# Patient Record
Sex: FEMALE | Race: WHITE | Employment: FULL TIME | URBAN - METROPOLITAN AREA
[De-identification: names, ages, dates, MRNs, and addresses within clinical notes are randomized per-mention and may not be internally consistent; named-entity substitution may affect disease eponyms.]

---

## 2018-11-12 ENCOUNTER — TELEPHONE (OUTPATIENT)
Dept: FAMILY MEDICINE CLINIC | Age: 58
End: 2018-11-12

## 2018-11-12 NOTE — TELEPHONE ENCOUNTER
Seen in [facility] on Friday for diverticulitis and was told to follow up in 7 days. 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 Please advise.

## 2018-11-16 ENCOUNTER — OFFICE VISIT (OUTPATIENT)
Dept: FAMILY MEDICINE CLINIC | Age: 58
End: 2018-11-16
Payer: COMMERCIAL

## 2018-11-16 VITALS
OXYGEN SATURATION: 97 % | SYSTOLIC BLOOD PRESSURE: 132 MMHG | DIASTOLIC BLOOD PRESSURE: 84 MMHG | TEMPERATURE: 97.6 F | WEIGHT: 254 LBS | HEART RATE: 70 BPM | BODY MASS INDEX: 32.61 KG/M2

## 2018-11-16 DIAGNOSIS — R10.30 LOWER ABDOMINAL PAIN: Primary | ICD-10-CM

## 2018-11-16 LAB
BILIRUBIN, POC: NORMAL
BLOOD URINE, POC: NORMAL
CLARITY, POC: CLEAR
COLOR, POC: YELLOW
GLUCOSE URINE, POC: NORMAL
KETONES, POC: NORMAL
LEUKOCYTE EST, POC: NORMAL
NITRITE, POC: NORMAL
PH, POC: 6
PROTEIN, POC: NORMAL
SPECIFIC GRAVITY, POC: 1.01
UROBILINOGEN, POC: 0.2

## 2018-11-16 PROCEDURE — 99213 OFFICE O/P EST LOW 20 MIN: CPT | Performed by: FAMILY MEDICINE

## 2018-11-16 PROCEDURE — 81002 URINALYSIS NONAUTO W/O SCOPE: CPT | Performed by: FAMILY MEDICINE

## 2018-11-16 RX ORDER — CIPROFLOXACIN 500 MG/1
500 TABLET, FILM COATED ORAL 2 TIMES DAILY
COMMUNITY
End: 2019-09-30 | Stop reason: ALTCHOICE

## 2018-11-16 RX ORDER — METRONIDAZOLE 500 MG/1
500 TABLET ORAL 3 TIMES DAILY
COMMUNITY
End: 2019-09-30 | Stop reason: ALTCHOICE

## 2018-11-16 ASSESSMENT — PATIENT HEALTH QUESTIONNAIRE - PHQ9
2. FEELING DOWN, DEPRESSED OR HOPELESS: 0
SUM OF ALL RESPONSES TO PHQ9 QUESTIONS 1 & 2: 0
SUM OF ALL RESPONSES TO PHQ QUESTIONS 1-9: 0
1. LITTLE INTEREST OR PLEASURE IN DOING THINGS: 0
SUM OF ALL RESPONSES TO PHQ QUESTIONS 1-9: 0

## 2018-11-19 ENCOUNTER — TELEPHONE (OUTPATIENT)
Dept: FAMILY MEDICINE CLINIC | Age: 58
End: 2018-11-19

## 2018-11-19 DIAGNOSIS — Z00.00 ENCOUNTER FOR WELL ADULT EXAM WITHOUT ABNORMAL FINDINGS: Primary | ICD-10-CM

## 2018-11-20 ENCOUNTER — NURSE ONLY (OUTPATIENT)
Dept: FAMILY MEDICINE CLINIC | Age: 58
End: 2018-11-20
Payer: COMMERCIAL

## 2018-11-20 ENCOUNTER — HOSPITAL ENCOUNTER (OUTPATIENT)
Age: 58
Discharge: HOME | End: 2018-11-22
Payer: COMMERCIAL

## 2018-11-20 DIAGNOSIS — Z00.00 ENCOUNTER FOR WELL ADULT EXAM WITHOUT ABNORMAL FINDINGS: ICD-10-CM

## 2018-11-20 DIAGNOSIS — Z00.00 ENCOUNTER FOR WELL ADULT EXAM WITHOUT ABNORMAL FINDINGS: Primary | ICD-10-CM

## 2018-11-20 LAB
ALBUMIN SERPL-MCNC: 4.3 G/DL (ref 3.5–5.2)
ALP BLD-CCNC: 59 U/L (ref 35–104)
ALT SERPL-CCNC: 44 U/L (ref 0–32)
ANION GAP SERPL CALCULATED.3IONS-SCNC: 14 MMOL/L (ref 7–16)
AST SERPL-CCNC: 37 U/L (ref 0–31)
BASOPHILS ABSOLUTE: 0.07 E9/L (ref 0–0.2)
BASOPHILS RELATIVE PERCENT: 1.1 % (ref 0–2)
BILIRUB SERPL-MCNC: 0.4 MG/DL (ref 0–1.2)
BUN BLDV-MCNC: 10 MG/DL (ref 6–20)
CALCIUM SERPL-MCNC: 9.2 MG/DL (ref 8.6–10.2)
CHLORIDE BLD-SCNC: 99 MMOL/L (ref 98–107)
CHOLESTEROL, TOTAL: 155 MG/DL (ref 0–199)
CO2: 26 MMOL/L (ref 22–29)
CREAT SERPL-MCNC: 0.9 MG/DL (ref 0.5–1)
EOSINOPHILS ABSOLUTE: 0.24 E9/L (ref 0.05–0.5)
EOSINOPHILS RELATIVE PERCENT: 3.6 % (ref 0–6)
GFR AFRICAN AMERICAN: >60
GFR NON-AFRICAN AMERICAN: >60 ML/MIN/1.73
GLUCOSE BLD-MCNC: 83 MG/DL (ref 74–99)
HCT VFR BLD CALC: 43.1 % (ref 34–48)
HDLC SERPL-MCNC: 55 MG/DL
HEMOGLOBIN: 13.6 G/DL (ref 11.5–15.5)
IMMATURE GRANULOCYTES #: 0.01 E9/L
IMMATURE GRANULOCYTES %: 0.2 % (ref 0–5)
LDL CHOLESTEROL CALCULATED: 80 MG/DL (ref 0–99)
LYMPHOCYTES ABSOLUTE: 2.2 E9/L (ref 1.5–4)
LYMPHOCYTES RELATIVE PERCENT: 33.1 % (ref 20–42)
MCH RBC QN AUTO: 28.1 PG (ref 26–35)
MCHC RBC AUTO-ENTMCNC: 31.6 % (ref 32–34.5)
MCV RBC AUTO: 89 FL (ref 80–99.9)
MONOCYTES ABSOLUTE: 0.62 E9/L (ref 0.1–0.95)
MONOCYTES RELATIVE PERCENT: 9.3 % (ref 2–12)
NEUTROPHILS ABSOLUTE: 3.5 E9/L (ref 1.8–7.3)
NEUTROPHILS RELATIVE PERCENT: 52.7 % (ref 43–80)
PDW BLD-RTO: 13.1 FL (ref 11.5–15)
PLATELET # BLD: 331 E9/L (ref 130–450)
PMV BLD AUTO: 11 FL (ref 7–12)
POTASSIUM SERPL-SCNC: 4.5 MMOL/L (ref 3.5–5)
RBC # BLD: 4.84 E12/L (ref 3.5–5.5)
SODIUM BLD-SCNC: 139 MMOL/L (ref 132–146)
TOTAL PROTEIN: 7.1 G/DL (ref 6.4–8.3)
TRIGL SERPL-MCNC: 100 MG/DL (ref 0–149)
VLDLC SERPL CALC-MCNC: 20 MG/DL
WBC # BLD: 6.6 E9/L (ref 4.5–11.5)

## 2018-11-20 PROCEDURE — 85025 COMPLETE CBC W/AUTO DIFF WBC: CPT

## 2018-11-20 PROCEDURE — 80053 COMPREHEN METABOLIC PANEL: CPT

## 2018-11-20 PROCEDURE — 80061 LIPID PANEL: CPT

## 2018-11-20 PROCEDURE — 36415 COLL VENOUS BLD VENIPUNCTURE: CPT | Performed by: FAMILY MEDICINE

## 2018-11-26 ENCOUNTER — OFFICE VISIT (OUTPATIENT)
Dept: FAMILY MEDICINE CLINIC | Age: 58
End: 2018-11-26
Payer: COMMERCIAL

## 2018-11-26 VITALS
OXYGEN SATURATION: 97 % | BODY MASS INDEX: 32.61 KG/M2 | HEART RATE: 76 BPM | WEIGHT: 254 LBS | TEMPERATURE: 98 F | SYSTOLIC BLOOD PRESSURE: 138 MMHG | DIASTOLIC BLOOD PRESSURE: 88 MMHG

## 2018-11-26 DIAGNOSIS — Z12.31 ENCOUNTER FOR MAMMOGRAM TO ESTABLISH BASELINE MAMMOGRAM: ICD-10-CM

## 2018-11-26 DIAGNOSIS — Z00.00 ENCOUNTER FOR WELL ADULT EXAM WITHOUT ABNORMAL FINDINGS: Primary | ICD-10-CM

## 2018-11-26 DIAGNOSIS — R74.8 ELEVATED LIVER ENZYMES: ICD-10-CM

## 2018-11-26 PROCEDURE — 99213 OFFICE O/P EST LOW 20 MIN: CPT | Performed by: FAMILY MEDICINE

## 2018-11-26 NOTE — PROGRESS NOTES
specific concerns today. Health maintenance reviewed and updated where agreeable. Labs reviewed and only concern is elevated liver function. Significantly improved cholesterol. Encounter for mammogram to establish baseline mammogram  -     VALERY DIGITAL SCREEN W CAD BILATERAL; Future    Elevated liver enzymes  -     Hepatic Function Panel; Future  -     Hepatitis Panel, Acute; Future  Recheck next month. Return in about 6 weeks (around 1/7/2019) for LABS. Patient counseled to follow up sooner or seek more acute care if symptoms worsening. Electronically signed by Sue Bolton MD on 11/27/2018    This note may have been created using dictation software.  Efforts were made to reduce grammatical or syntax errors, but some may persist.

## 2018-12-14 ENCOUNTER — HOSPITAL ENCOUNTER (OUTPATIENT)
Dept: MAMMOGRAPHY | Age: 58
Discharge: HOME | End: 2018-12-16
Payer: COMMERCIAL

## 2018-12-14 DIAGNOSIS — Z12.31 ENCOUNTER FOR MAMMOGRAM TO ESTABLISH BASELINE MAMMOGRAM: ICD-10-CM

## 2018-12-14 PROCEDURE — 77067 SCR MAMMO BI INCL CAD: CPT

## 2019-01-08 ENCOUNTER — HOSPITAL ENCOUNTER (OUTPATIENT)
Age: 59
Discharge: HOME | End: 2019-01-10
Payer: COMMERCIAL

## 2019-01-08 ENCOUNTER — TELEPHONE (OUTPATIENT)
Dept: FAMILY MEDICINE CLINIC | Age: 59
End: 2019-01-08

## 2019-01-08 ENCOUNTER — NURSE ONLY (OUTPATIENT)
Dept: FAMILY MEDICINE CLINIC | Age: 59
End: 2019-01-08
Payer: COMMERCIAL

## 2019-01-08 DIAGNOSIS — R74.8 ELEVATED LIVER ENZYMES: ICD-10-CM

## 2019-01-08 DIAGNOSIS — R74.8 ELEVATED LIVER ENZYMES: Primary | ICD-10-CM

## 2019-01-08 PROCEDURE — 80076 HEPATIC FUNCTION PANEL: CPT

## 2019-01-08 PROCEDURE — 36415 COLL VENOUS BLD VENIPUNCTURE: CPT | Performed by: FAMILY MEDICINE

## 2019-01-08 PROCEDURE — 80074 ACUTE HEPATITIS PANEL: CPT

## 2019-01-09 LAB
ALBUMIN SERPL-MCNC: 4.3 G/DL (ref 3.5–5.2)
ALP BLD-CCNC: 64 U/L (ref 35–104)
ALT SERPL-CCNC: 19 U/L (ref 0–32)
AST SERPL-CCNC: 20 U/L (ref 0–31)
BILIRUB SERPL-MCNC: 0.3 MG/DL (ref 0–1.2)
BILIRUBIN DIRECT: <0.2 MG/DL (ref 0–0.3)
BILIRUBIN, INDIRECT: NORMAL MG/DL (ref 0–1)
HAV IGM SER IA-ACNC: NORMAL
HEPATITIS B CORE IGM ANTIBODY: NORMAL
HEPATITIS B SURFACE ANTIGEN INTERPRETATION: NORMAL
HEPATITIS C ANTIBODY INTERPRETATION: NORMAL
TOTAL PROTEIN: 7.6 G/DL (ref 6.4–8.3)

## 2019-09-30 ENCOUNTER — OFFICE VISIT (OUTPATIENT)
Dept: FAMILY MEDICINE CLINIC | Age: 59
End: 2019-09-30
Payer: COMMERCIAL

## 2019-09-30 VITALS
WEIGHT: 261 LBS | DIASTOLIC BLOOD PRESSURE: 88 MMHG | BODY MASS INDEX: 35.35 KG/M2 | SYSTOLIC BLOOD PRESSURE: 136 MMHG | HEART RATE: 80 BPM | HEIGHT: 72 IN | TEMPERATURE: 98.8 F | OXYGEN SATURATION: 96 %

## 2019-09-30 DIAGNOSIS — J20.9 ACUTE BRONCHITIS, UNSPECIFIED ORGANISM: Primary | ICD-10-CM

## 2019-09-30 PROCEDURE — 99213 OFFICE O/P EST LOW 20 MIN: CPT | Performed by: PHYSICIAN ASSISTANT

## 2019-09-30 RX ORDER — CIPROFLOXACIN HYDROCHLORIDE 3.5 MG/ML
SOLUTION/ DROPS TOPICAL
Refills: 0 | COMMUNITY
Start: 2019-09-27 | End: 2020-01-15

## 2019-09-30 RX ORDER — CEFDINIR 300 MG/1
300 CAPSULE ORAL 2 TIMES DAILY
Qty: 20 CAPSULE | Refills: 0 | Status: SHIPPED | OUTPATIENT
Start: 2019-09-30 | End: 2019-10-10

## 2019-09-30 NOTE — PROGRESS NOTES
2019   [address] [address]     Camilo Campa  : 1960 Age: 64 y.o. Sex: female       Subjective:  Chief Complaint   Patient presents with    Sinus Problem    Pharyngitis       HPI:  The patient states that they have had a cough, runny nose and nasal congestion for the last 7 days. Cough is productive of sputum. The patient also reports mild sore throat without pain with swallowing/difficulty swallowing. Denies fever or chills. Patient denies CP or dyspnea. No vomiting or diarrhea. The patient presents for evaluation. ROS:  Positive and pertinent negatives as per HPI. All other systems are reviewed and negative. Current Outpatient Medications:     ciprofloxacin (CILOXAN) 0.3 % ophthalmic solution, INSTILL 1 DROP INTO EACH EYE EVERY 3 HOURS, Disp: , Rfl: 0    cefdinir (OMNICEF) 300 MG capsule, Take 1 capsule by mouth 2 times daily for 10 days, Disp: 20 capsule, Rfl: 0   Allergies   Allergen Reactions    Pyridium [Phenazopyridine Hcl]         Objective:  Vitals:    19 1459   BP: 136/88   Pulse: 80   Temp: 98.8 °F (37.1 °C)   TempSrc: Temporal   SpO2: 96%   Weight: 261 lb (118.4 kg)   Height: 6' 2\" (1.88 m)      Exam:  Const: Appears healthy and well developed. No signs of acute distress present. Vitals reviewed per triage. Head/Face: Normocephalic, atraumatic. Facies is symmetric. Eyes: PERRL. ENMT: Tympanic membranes are pearly gray with good light reflex bilaterally. Nares with clear rhinorrhea. Buccal mucosa is moist. Mild erythema in the posterior pharynx without edema of oropharynx or petechiae of palate. Neck: Supple and symmetric. Palpation reveals no adenopathy. No meningeal signs. Trachea midline. Resp: Lungs are clear to auscultation bilaterally without wheezes, rhonchi, or crackles. Chest expansion was symmetrical without accessory muscle use noted.   CV: S1 is normal. S2 is

## 2020-01-15 ENCOUNTER — OFFICE VISIT (OUTPATIENT)
Dept: FAMILY MEDICINE CLINIC | Age: 60
End: 2020-01-15
Payer: COMMERCIAL

## 2020-01-15 VITALS
DIASTOLIC BLOOD PRESSURE: 100 MMHG | RESPIRATION RATE: 18 BRPM | HEART RATE: 87 BPM | SYSTOLIC BLOOD PRESSURE: 160 MMHG | WEIGHT: 264 LBS | TEMPERATURE: 98.8 F | BODY MASS INDEX: 35.76 KG/M2 | OXYGEN SATURATION: 96 % | HEIGHT: 72 IN

## 2020-01-15 PROCEDURE — 99213 OFFICE O/P EST LOW 20 MIN: CPT | Performed by: PHYSICIAN ASSISTANT

## 2020-01-15 RX ORDER — CEFDINIR 300 MG/1
300 CAPSULE ORAL 2 TIMES DAILY
Qty: 20 CAPSULE | Refills: 0 | Status: SHIPPED | OUTPATIENT
Start: 2020-01-15 | End: 2020-01-25

## 2020-09-23 ENCOUNTER — OFFICE VISIT (OUTPATIENT)
Dept: FAMILY MEDICINE CLINIC | Age: 60
End: 2020-09-23
Payer: COMMERCIAL

## 2020-09-23 VITALS
SYSTOLIC BLOOD PRESSURE: 170 MMHG | OXYGEN SATURATION: 99 % | HEIGHT: 72 IN | BODY MASS INDEX: 36.57 KG/M2 | WEIGHT: 270 LBS | DIASTOLIC BLOOD PRESSURE: 106 MMHG | TEMPERATURE: 97.9 F | HEART RATE: 72 BPM

## 2020-09-23 PROCEDURE — 99213 OFFICE O/P EST LOW 20 MIN: CPT | Performed by: FAMILY MEDICINE

## 2020-09-23 PROCEDURE — 3017F COLORECTAL CA SCREEN DOC REV: CPT | Performed by: FAMILY MEDICINE

## 2020-09-23 PROCEDURE — G8427 DOCREV CUR MEDS BY ELIG CLIN: HCPCS | Performed by: FAMILY MEDICINE

## 2020-09-23 PROCEDURE — 1036F TOBACCO NON-USER: CPT | Performed by: FAMILY MEDICINE

## 2020-09-23 PROCEDURE — G8417 CALC BMI ABV UP PARAM F/U: HCPCS | Performed by: FAMILY MEDICINE

## 2020-09-23 RX ORDER — METHYLPREDNISOLONE 4 MG/1
TABLET ORAL
Qty: 1 KIT | Refills: 0 | Status: SHIPPED
Start: 2020-09-23 | End: 2020-10-16 | Stop reason: ALTCHOICE

## 2020-09-23 ASSESSMENT — ENCOUNTER SYMPTOMS
DIARRHEA: 0
SHORTNESS OF BREATH: 0
NAUSEA: 0
PHOTOPHOBIA: 0
ABDOMINAL PAIN: 0
BLOOD IN STOOL: 0
COUGH: 0
BACK PAIN: 0
SORE THROAT: 0
VOMITING: 0
CONSTIPATION: 0

## 2020-10-16 ENCOUNTER — OFFICE VISIT (OUTPATIENT)
Dept: FAMILY MEDICINE CLINIC | Age: 60
End: 2020-10-16
Payer: COMMERCIAL

## 2020-10-16 ENCOUNTER — HOSPITAL ENCOUNTER (OUTPATIENT)
Age: 60
Discharge: HOME | End: 2020-10-18
Payer: COMMERCIAL

## 2020-10-16 VITALS
TEMPERATURE: 98 F | DIASTOLIC BLOOD PRESSURE: 94 MMHG | HEART RATE: 77 BPM | BODY MASS INDEX: 35.49 KG/M2 | OXYGEN SATURATION: 99 % | WEIGHT: 262 LBS | HEIGHT: 72 IN | SYSTOLIC BLOOD PRESSURE: 152 MMHG

## 2020-10-16 LAB
ALBUMIN SERPL-MCNC: 4.7 G/DL (ref 3.5–5.2)
ALP BLD-CCNC: 67 U/L (ref 35–104)
ALT SERPL-CCNC: 33 U/L (ref 0–32)
ANION GAP SERPL CALCULATED.3IONS-SCNC: 13 MMOL/L (ref 7–16)
AST SERPL-CCNC: 31 U/L (ref 0–31)
BASOPHILS ABSOLUTE: 0.07 E9/L (ref 0–0.2)
BASOPHILS RELATIVE PERCENT: 0.8 % (ref 0–2)
BILIRUB SERPL-MCNC: 0.4 MG/DL (ref 0–1.2)
BUN BLDV-MCNC: 15 MG/DL (ref 6–20)
CALCIUM SERPL-MCNC: 9.9 MG/DL (ref 8.6–10.2)
CHLORIDE BLD-SCNC: 101 MMOL/L (ref 98–107)
CHOLESTEROL, TOTAL: 209 MG/DL (ref 0–199)
CO2: 27 MMOL/L (ref 22–29)
CREAT SERPL-MCNC: 0.9 MG/DL (ref 0.5–1)
EOSINOPHILS ABSOLUTE: 0.31 E9/L (ref 0.05–0.5)
EOSINOPHILS RELATIVE PERCENT: 3.3 % (ref 0–6)
GFR AFRICAN AMERICAN: >60
GFR NON-AFRICAN AMERICAN: >60 ML/MIN/1.73
GLUCOSE BLD-MCNC: 84 MG/DL (ref 74–99)
HCT VFR BLD CALC: 45.5 % (ref 34–48)
HDLC SERPL-MCNC: 66 MG/DL
HEMOGLOBIN: 14.6 G/DL (ref 11.5–15.5)
IMMATURE GRANULOCYTES #: 0.02 E9/L
IMMATURE GRANULOCYTES %: 0.2 % (ref 0–5)
LDL CHOLESTEROL CALCULATED: 114 MG/DL (ref 0–99)
LYMPHOCYTES ABSOLUTE: 3.03 E9/L (ref 1.5–4)
LYMPHOCYTES RELATIVE PERCENT: 32.7 % (ref 20–42)
MCH RBC QN AUTO: 28.4 PG (ref 26–35)
MCHC RBC AUTO-ENTMCNC: 32.1 % (ref 32–34.5)
MCV RBC AUTO: 88.5 FL (ref 80–99.9)
MONOCYTES ABSOLUTE: 0.79 E9/L (ref 0.1–0.95)
MONOCYTES RELATIVE PERCENT: 8.5 % (ref 2–12)
NEUTROPHILS ABSOLUTE: 5.06 E9/L (ref 1.8–7.3)
NEUTROPHILS RELATIVE PERCENT: 54.5 % (ref 43–80)
PDW BLD-RTO: 14 FL (ref 11.5–15)
PLATELET # BLD: 317 E9/L (ref 130–450)
PMV BLD AUTO: 10.9 FL (ref 7–12)
POTASSIUM SERPL-SCNC: 3.9 MMOL/L (ref 3.5–5)
RBC # BLD: 5.14 E12/L (ref 3.5–5.5)
SODIUM BLD-SCNC: 141 MMOL/L (ref 132–146)
TOTAL PROTEIN: 7.8 G/DL (ref 6.4–8.3)
TRIGL SERPL-MCNC: 143 MG/DL (ref 0–149)
VLDLC SERPL CALC-MCNC: 29 MG/DL
WBC # BLD: 9.3 E9/L (ref 4.5–11.5)

## 2020-10-16 PROCEDURE — G8484 FLU IMMUNIZE NO ADMIN: HCPCS | Performed by: FAMILY MEDICINE

## 2020-10-16 PROCEDURE — 80053 COMPREHEN METABOLIC PANEL: CPT

## 2020-10-16 PROCEDURE — G8427 DOCREV CUR MEDS BY ELIG CLIN: HCPCS | Performed by: FAMILY MEDICINE

## 2020-10-16 PROCEDURE — 80061 LIPID PANEL: CPT

## 2020-10-16 PROCEDURE — G8417 CALC BMI ABV UP PARAM F/U: HCPCS | Performed by: FAMILY MEDICINE

## 2020-10-16 PROCEDURE — 36415 COLL VENOUS BLD VENIPUNCTURE: CPT

## 2020-10-16 PROCEDURE — 3017F COLORECTAL CA SCREEN DOC REV: CPT | Performed by: FAMILY MEDICINE

## 2020-10-16 PROCEDURE — 99213 OFFICE O/P EST LOW 20 MIN: CPT | Performed by: FAMILY MEDICINE

## 2020-10-16 PROCEDURE — 1036F TOBACCO NON-USER: CPT | Performed by: FAMILY MEDICINE

## 2020-10-16 PROCEDURE — 85025 COMPLETE CBC W/AUTO DIFF WBC: CPT

## 2020-10-16 ASSESSMENT — PATIENT HEALTH QUESTIONNAIRE - PHQ9
SUM OF ALL RESPONSES TO PHQ QUESTIONS 1-9: 0
SUM OF ALL RESPONSES TO PHQ QUESTIONS 1-9: 0
SUM OF ALL RESPONSES TO PHQ9 QUESTIONS 1 & 2: 0
1. LITTLE INTEREST OR PLEASURE IN DOING THINGS: 0
2. FEELING DOWN, DEPRESSED OR HOPELESS: 0
SUM OF ALL RESPONSES TO PHQ QUESTIONS 1-9: 0

## 2020-10-16 NOTE — PROGRESS NOTES
[facility]  Office Progress Note - Dr. Radha Lovett  10/16/20    CC:   Chief Complaint   Patient presents with    Knee Pain     Left knee. Follow up from [facility] on 09/23        HPI: UC FU    Left knee pain  Couple weeks  No injury  Posterior. Lateral  No instability. Worse with flexion. Tylenol ~ helpful. xrays reviewed with patient, mild arthritis BL, decent joint space. Suspect baker cyst given location and fullness on PE.     /  Elev BP reading. Has bene a few of them now. Blood pressure is not controlled today. BP Readings from Last 3 Encounters:   10/16/20 (!) 152/94   09/23/20 (!) 170/106   01/15/20 (!) 160/100   Never Tx  Denies CP, sob, abd pain, headaches, vision changes, dizziness, hypotensive symptoms. No recent labs. Due for mammo. Notes Hx area that they had watched in the past.      _________________________________________________________  No past medical history on file. Family History   Problem Relation Age of Onset    Heart Disease Mother 58    Cancer Father 79        Colon    Diabetes Father        Past Surgical History:   Procedure Laterality Date    HYSTERECTOMY      MANDIBLE FRACTURE SURGERY      VARICOSE VEIN SURGERY         Social History     Tobacco Use    Smoking status: Never Smoker    Smokeless tobacco: Never Used   Substance Use Topics    Alcohol use: No    Drug use: No     _________________________________________________________  ROS: POSITIVE:  Otherwise:  No CP, No palpitations,   No sob, No cough,   No abd pain, No heartburn,   No headaches, No vision changes, No hearing changes,   No tingling, No numbness, No weakness,   No bowel changes, No hematochezia, No melena,  No bladder changes, No hematuria  No skin rashes, No skin lesions. No polyuria, polydipsia, polyphagia. Stable mood. ROS otherwise negative unless as listed in HPI.     Chart reviewed and updated where appropriate for PMH, Fam, and Soc Hx.  __________________________________________________________  Physical Exam   BP (!) 152/94 (Site: Left Upper Arm, Position: Sitting, Cuff Size: Large Adult)   Pulse 77   Temp 98 °F (36.7 °C) (Temporal)   Ht 6' 2\" (1.88 m)   Wt 262 lb (118.8 kg)   SpO2 99%   BMI 33.64 kg/m²   Wt Readings from Last 3 Encounters:   10/16/20 262 lb (118.8 kg)   09/23/20 270 lb (122.5 kg)   01/15/20 264 lb (119.7 kg)       Constitutional:    She is oriented to person, place, and time. She appears well-developed and well-nourished. HENT:    Nose: Nose normal.    Mouth/Throat: Oropharynx is clear and moist.   Eyes:    Conjunctivae are normal.    Pupils are equal, round, and reactive to light. EOMI. Neck:    Normal range of motion. No thyromegaly or nodules noted. No bruit. Cardiovascular:    Normal rate, regular rhythm and normal heart sounds. No murmur. No gallop and no friction rub. Pulmonary/Chest:    Effort normal and breath sounds normal.    No wheezes. No rales or rhonchi. Abdominal:    Soft. Bowel sounds are normal.    No distension. No tenderness. Musculoskeletal:    Normal range of motion. Fullness posterior lateral left knee. Stable joint, no laxity. No effusion. No jointline ttp. No joint swelling noted. No peripheral edema. Skin:    Skin is warm and dry. No rashes, No lesions. ________________________________________________________  No current outpatient medications on file prior to visit. No current facility-administered medications on file prior to visit. Patient Active Problem List   Diagnosis Code    Newly recognized heart murmur R01.1    Breast nodule N63.0      ________________________________________________________  Assessment / Oriana Peters was seen today for knee pain. Diagnoses and all orders for this visit:    Elevated blood pressure reading  -     CBC Auto Differential; Future  -     Comprehensive Metabolic Panel;  Future  -     Lipid Panel;

## 2020-10-16 NOTE — PATIENT INSTRUCTIONS
Patient Education        Leroy's Cyst: Care Instructions  Your Care Instructions     A Baker's cyst is a swelling behind the knee. It may cause pain or stiffness when you bend your knee or straighten it all the way. Baker's cysts are also called popliteal cysts. If you have arthritis or another condition that is the cause of the Baker's cyst, your doctor may treat that condition. A Baker's cyst may go away on its own. If not, or if it is causing a lot of discomfort, your doctor may drain the fluid that has built up behind the knee. In some cases, a Baker's cyst is removed in surgery. There are things you can do at home, such as staying off your leg, to reduce the swelling and pain. Follow-up care is a key part of your treatment and safety. Be sure to make and go to all appointments, and call your doctor if you are having problems. It's also a good idea to know your test results and keep a list of the medicines you take. How can you care for yourself at home? · Rest your knee as much as possible. · Ask your doctor if you can take an over-the-counter pain medicine, such as acetaminophen (Tylenol), ibuprofen (Advil, Motrin), or naproxen (Aleve). Be safe with medicines. Read and follow all instructions on the label. · Use a cane, a crutch, a walker, or another device if you need help to get around. These can help rest your knees. · If you have an elastic bandage, make sure it is snug but not so tight that your leg is numb, tingles, or swells below the bandage. Ask your doctor if you can loosen the bandage if it is too tight. · Follow your doctor's instructions about how much weight you can put on your knee. · Stay at a healthy weight. Being overweight puts extra strain on your knee. When should you call for help? Call 911 anytime you think you may need emergency care. For example, call if:    · You have chest pain, are short of breath, or you cough up blood.    Call your doctor now or seek immediate medical care if:    · You have new or worse pain.     · Your foot is cool or pale or changes color.     · You have tingling, weakness, or numbness in your foot or toes.     · You have signs of a blood clot in your leg (called a deep vein thrombosis), such as:  ? Pain in your calf, back of the knee, thigh, or groin. ? Redness or swelling in your leg. Watch closely for changes in your health, and be sure to contact your doctor if:    · You do not get better as expected. Where can you learn more? Go to https://[employer]. org and sign in to your [employer] account. Enter P787 in the [employer] box to learn more about \"Baker's Cyst: Care Instructions. \"     If you do not have an account, please click on the \"Sign Up Now\" link. Current as of: March 2, 2020               Content Version: 12.6  © 3058-8587 [employer]. Care instructions adapted under license by [facility] ([facility]). If you have questions about a medical condition or this instruction, always ask your healthcare professional. [place] [address] any warranty or liability for your use of this information.

## 2020-10-20 ENCOUNTER — TELEPHONE (OUTPATIENT)
Dept: ONCOLOGY | Age: 60
End: 2020-10-20

## 2020-10-20 NOTE — TELEPHONE ENCOUNTER
Call to patient in reference to her mammogram performed at [facility] on October 16, 2020. Instructed patient that the radiologist has recommended some additional breast imaging  in order to make a final determination/result. Patient is declining any additional imaging at this time. Physician notified.       Kelvin Sotomayor RN, BSN, [address]

## 2020-10-27 ENCOUNTER — TELEPHONE (OUTPATIENT)
Dept: FAMILY MEDICINE CLINIC | Age: 60
End: 2020-10-27

## 2020-10-27 RX ORDER — MELOXICAM 15 MG/1
15 TABLET ORAL DAILY
Qty: 30 TABLET | Refills: 0 | Status: SHIPPED
Start: 2020-10-27 | End: 2020-11-23 | Stop reason: SDUPTHER

## 2020-10-27 NOTE — TELEPHONE ENCOUNTER
Patient called back she left message for u/s scheduling. Will you order her something for pain?  She is getting no relief from otc arthritis pain relief

## 2020-10-27 NOTE — TELEPHONE ENCOUNTER
Id like to get an US of her left knee as I think she might have a cyst behind the knee as her source of pain. Orders placed. Can be done at [place]. Or can fax to [facility] if this works better for her.

## 2020-11-23 RX ORDER — MELOXICAM 15 MG/1
15 TABLET ORAL DAILY
Qty: 30 TABLET | Refills: 1 | Status: SHIPPED
Start: 2020-11-23 | End: 2021-01-12 | Stop reason: SDUPTHER

## 2020-11-23 NOTE — TELEPHONE ENCOUNTER
Name of Medication(s) Requested:  Meloxicam    Pharmacy Requested:   [facility]    Medication(s) pended? [x] Yes  [] No    Last Appointment:  10/16/2020    Future appts:  Future Appointments   Date Time Provider Rae Juana   12/1/2020  3:40 PM Kyle Arrieta MD [facility]        Does patient need call back?   [] Yes  [x] No

## 2021-01-12 ENCOUNTER — OFFICE VISIT (OUTPATIENT)
Dept: FAMILY MEDICINE CLINIC | Age: 61
End: 2021-01-12
Payer: COMMERCIAL

## 2021-01-12 VITALS
BODY MASS INDEX: 36.03 KG/M2 | HEIGHT: 72 IN | HEART RATE: 73 BPM | SYSTOLIC BLOOD PRESSURE: 118 MMHG | DIASTOLIC BLOOD PRESSURE: 72 MMHG | TEMPERATURE: 97.6 F | OXYGEN SATURATION: 97 % | WEIGHT: 266 LBS

## 2021-01-12 DIAGNOSIS — M25.562 ACUTE PAIN OF LEFT KNEE: Primary | ICD-10-CM

## 2021-01-12 PROCEDURE — G8427 DOCREV CUR MEDS BY ELIG CLIN: HCPCS | Performed by: FAMILY MEDICINE

## 2021-01-12 PROCEDURE — 3017F COLORECTAL CA SCREEN DOC REV: CPT | Performed by: FAMILY MEDICINE

## 2021-01-12 PROCEDURE — G8417 CALC BMI ABV UP PARAM F/U: HCPCS | Performed by: FAMILY MEDICINE

## 2021-01-12 PROCEDURE — G8484 FLU IMMUNIZE NO ADMIN: HCPCS | Performed by: FAMILY MEDICINE

## 2021-01-12 PROCEDURE — 1036F TOBACCO NON-USER: CPT | Performed by: FAMILY MEDICINE

## 2021-01-12 PROCEDURE — 99213 OFFICE O/P EST LOW 20 MIN: CPT | Performed by: FAMILY MEDICINE

## 2021-01-12 RX ORDER — MELOXICAM 15 MG/1
15 TABLET ORAL DAILY PRN
Qty: 30 TABLET | Refills: 1 | Status: SHIPPED
Start: 2021-01-12 | End: 2021-09-20

## 2021-01-12 NOTE — PROGRESS NOTES
[facility]  Office Progress Note - Dr. Ruby Tate  1/12/21    CC:   Chief Complaint   Patient presents with    Knee Pain     1 mo f/u on left knee pain. Recently prescribed mobic. Pt reports improvment. HPI: Follow-up on knee pain  Greatly improved  Using meloxicam 15 mg daily as needed  She has been spacing dosing  Recently the left knee has not been painful at all. No functional impairments. She is happy with her progress    On exam she has normal flexion and extension. No tenderness to palpation. She has crepitus which seems to be coming from the patella. Declines flu shot. Discussed shingles. Discussed Covid vaccine. _________________________________________________________    Assessment / Sonam Nair was seen today for knee pain. Diagnoses and all orders for this visit:    Acute pain of left knee    Other orders  -     meloxicam (MOBIC) 15 MG tablet; Take 1 tablet by mouth daily as needed for Pain    Problem currently resolved. She can continue meloxicam as needed. She has had no tenderness. Counseled on vaccinations. Return in about 10 months (around 11/12/2021). or as scheduled. Patient counseled to follow up sooner or seek more acute care if symptoms worsening or not improving according to plan. Electronically signed by Allie Ventura MD on 1/12/2021    _________________________________________________________  No current outpatient medications on file prior to visit. No current facility-administered medications on file prior to visit. Patient Active Problem List   Diagnosis Code    Newly recognized heart murmur R01.1    Breast nodule N63.0     _________________________________________________________  No past medical history on file.     Family History   Problem Relation Age of Onset    Heart Disease Mother 58    Cancer Father 79        Colon    Diabetes Father        Past Surgical History:   Procedure Laterality Date  HYSTERECTOMY      MANDIBLE FRACTURE SURGERY      VARICOSE VEIN SURGERY         Social History     Tobacco Use    Smoking status: Never Smoker    Smokeless tobacco: Never Used   Substance Use Topics    Alcohol use: No    Drug use: No       Chart reviewed and updated where appropriate for PMH, Fam, and Soc Hx.  _________________________________________________________  ROS: POSITIVE: As in the HPI. Otherwise Pertinent negatives are negative.    __________________________________________________________  Physical Exam   /72 (Site: Right Upper Arm, Position: Sitting, Cuff Size: Large Adult)   Pulse 73   Temp 97.6 °F (36.4 °C) (Temporal)   Ht 6' 2\" (1.88 m)   Wt 266 lb (120.7 kg)   SpO2 97%   BMI 34.15 kg/m²   Wt Readings from Last 3 Encounters:   01/12/21 266 lb (120.7 kg)   10/16/20 262 lb (118.8 kg)   09/23/20 270 lb (122.5 kg)       Constitutional:    She is oriented to person, place, and time. She appears well-developed and well-nourished. Musculoskeletal:    Normal range of motion. No joint swelling noted. No peripheral edema. Neurological:    She is A&Ox3    Motor and sensation grossly intact. Normal Gait.     ________________________________________________________    This note may have been created using dictation software.  Efforts were made to reduce grammatical or syntax errors, but some may persist.

## 2021-09-20 ENCOUNTER — OFFICE VISIT (OUTPATIENT)
Dept: FAMILY MEDICINE CLINIC | Age: 61
End: 2021-09-20
Payer: COMMERCIAL

## 2021-09-20 VITALS
BODY MASS INDEX: 35.62 KG/M2 | RESPIRATION RATE: 20 BRPM | WEIGHT: 263 LBS | HEIGHT: 72 IN | TEMPERATURE: 97.8 F | SYSTOLIC BLOOD PRESSURE: 142 MMHG | HEART RATE: 68 BPM | DIASTOLIC BLOOD PRESSURE: 88 MMHG | OXYGEN SATURATION: 95 %

## 2021-09-20 DIAGNOSIS — Z20.822 SUSPECTED COVID-19 VIRUS INFECTION: ICD-10-CM

## 2021-09-20 DIAGNOSIS — R53.83 FATIGUE, UNSPECIFIED TYPE: Primary | ICD-10-CM

## 2021-09-20 DIAGNOSIS — R09.81 NASAL CONGESTION: ICD-10-CM

## 2021-09-20 DIAGNOSIS — R03.0 ELEVATED BLOOD PRESSURE READING: ICD-10-CM

## 2021-09-20 LAB
Lab: NORMAL
PERFORMING INSTRUMENT: NORMAL
QC PASS/FAIL: NORMAL
SARS-COV-2, POC: NORMAL

## 2021-09-20 PROCEDURE — 99213 OFFICE O/P EST LOW 20 MIN: CPT | Performed by: STUDENT IN AN ORGANIZED HEALTH CARE EDUCATION/TRAINING PROGRAM

## 2021-09-20 PROCEDURE — 87426 SARSCOV CORONAVIRUS AG IA: CPT | Performed by: STUDENT IN AN ORGANIZED HEALTH CARE EDUCATION/TRAINING PROGRAM

## 2021-09-20 PROCEDURE — 3017F COLORECTAL CA SCREEN DOC REV: CPT | Performed by: STUDENT IN AN ORGANIZED HEALTH CARE EDUCATION/TRAINING PROGRAM

## 2021-09-20 PROCEDURE — 1036F TOBACCO NON-USER: CPT | Performed by: STUDENT IN AN ORGANIZED HEALTH CARE EDUCATION/TRAINING PROGRAM

## 2021-09-20 PROCEDURE — G8417 CALC BMI ABV UP PARAM F/U: HCPCS | Performed by: STUDENT IN AN ORGANIZED HEALTH CARE EDUCATION/TRAINING PROGRAM

## 2021-09-20 PROCEDURE — G8427 DOCREV CUR MEDS BY ELIG CLIN: HCPCS | Performed by: STUDENT IN AN ORGANIZED HEALTH CARE EDUCATION/TRAINING PROGRAM

## 2021-09-20 ASSESSMENT — ENCOUNTER SYMPTOMS
RHINORRHEA: 0
SHORTNESS OF BREATH: 0
NAUSEA: 0
SORE THROAT: 1
DIARRHEA: 0
COUGH: 1
VOMITING: 0

## 2021-09-20 NOTE — LETTER
[address]  Phone: [phone]  Fax: [phone]    Randy Rubio MD        September 20, 2021     Patient: Ann Nathan   YOB: 1960   Date of Visit: 9/20/2021       To Whom It May Concern:    Ann Nathan had signs and symptoms concerning for COVID-19 infection. The patient was tested with rapid COVID-19 testing and found to be negative. Given upper respiratory symptoms have resolved, patient may return to work. If you have any questions or concerns, please don't hesitate to call.     Sincerely,    Randy Rubio MD

## 2021-09-20 NOTE — PROGRESS NOTES
WALK-IN CARE CLINIC VISIT    21  Name: Melissa Plata   : 1960   Age: 63 y.o. Sex: female        Assessment & Plan:       ICD-10-CM    1. Fatigue, unspecified type  R53.83 POCT COVID-19, Antigen   2. Nasal congestion  R09.81 POCT COVID-19, Antigen   3. Suspected COVID-19 virus infection  Z20.822 POCT COVID-19, Antigen   4. Elevated blood pressure reading  R03.0      No evidence of acute bacterial infection. History and exam consistent with viral URI vs allergic rhinitis. COVID suspected. Rapid COVID test negative. Patient may return to work. Advised symptoms likely related to seasonal allergies last week and discussed OTC options if returns. Advised to follow up with PCP if fatigue persists. Advised to follow up with PCP for elevated blood pressure. Patient reports a history of white coat hypertension. Counseled patient regarding above diagnosis, including possible risks and complications. All educational materials and instructions were discussed and included on the After Visit Summary. All questions answered to the patient's satisfaction. The patient was advised to call for any concerns or return if any of the signs or symptoms worsen. This provider was wearing N95 mask and shield. The patient was wearing surgical mask. We practiced social distancing when appropriate during visit due to COVID-19 pandemic and patient's symptoms. 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    HENT: Positive for congestion, ear pain and sore throat. Negative for rhinorrhea. Negative for loss of taste and smell. Respiratory: Positive for cough. Negative for shortness of breath. Cardiovascular: Negative for chest pain. Gastrointestinal: Negative for diarrhea, nausea and vomiting. Musculoskeletal: Negative for arthralgias and myalgias. Skin: Negative for rash. Medical History:     Patient Active Problem List   Diagnosis    Newly recognized heart murmur    Breast nodule        No past medical history on file. Past Surgical History:   Procedure Laterality Date    HYSTERECTOMY      MANDIBLE FRACTURE SURGERY      VARICOSE VEIN SURGERY         Family History   Problem Relation Age of Onset    Heart Disease Mother 58    Cancer Father 79        Colon    Diabetes Father        Medications:   No current outpatient medications on file. Allergies: Allergies   Allergen Reactions    Pyridium [Phenazopyridine Hcl]        Social History:     Social History     Socioeconomic History    Marital status:      Spouse name: Not on file    Number of children: Not on file    Years of education: Not on file    Highest education level: Not on file   Occupational History    Not on file   Tobacco Use    Smoking status: Never Smoker    Smokeless tobacco: Never Used   Substance and Sexual Activity    Alcohol use: No    Drug use: No    Sexual activity: Not on file   Other Topics Concern    Not on file   Social History Narrative    Not on file     Social Determinants of Health     Financial Resource Strain:     Difficulty of Paying Living Expenses:    Food Insecurity:     Worried About Running Out of Food in the Last Year:     [address] in the Last Year:    Transportation Needs:     Lack of Transportation (Medical):      Lack of Transportation (Non-Medical):    Physical Activity:     Days of Exercise per Week:     Minutes of Exercise per Session:    Stress:     Feeling of Stress :    Social Connections:     Frequency of Communication with Friends and Family:     Frequency of Social Gatherings with Friends and Family:     Attends Church Services:     Active Member of Clubs or Organizations:     Attends Club or Organization Meetings:     Marital Status:    Intimate Partner Violence:     Fear of Current or Ex-Partner:     Emotionally Abused:     Physically Abused:     Sexually Abused:        Physical Exam:     Vitals:    09/20/21 1123 09/20/21 1125   BP: (!) 142/88 (!) 142/88   Pulse: 68    Resp: 20    Temp: 97.8 °F (36.6 °C)    TempSrc: Temporal    SpO2: 95%    Weight: 263 lb (119.3 kg)    Height: 6' 2\" (1.88 m)         Physical Exam  Vitals and nursing note reviewed. Constitutional:       General: She is not in acute distress. Appearance: Normal appearance. She is not ill-appearing or diaphoretic. HENT:      Right Ear: Tympanic membrane, ear canal and external ear normal.      Left Ear: Tympanic membrane, ear canal and external ear normal.      Nose: Mucosal edema and congestion present. No rhinorrhea. Right Turbinates: Enlarged. Left Turbinates: Enlarged. Mouth/Throat:      Mouth: Mucous membranes are moist.      Pharynx: Oropharynx is clear. No oropharyngeal exudate or posterior oropharyngeal erythema. Eyes:      Extraocular Movements: Extraocular movements intact. Conjunctiva/sclera: Conjunctivae normal.   Cardiovascular:      Rate and Rhythm: Normal rate and regular rhythm. Pulses: Normal pulses. Heart sounds: Normal heart sounds. Pulmonary:      Effort: Pulmonary effort is normal. No respiratory distress. Breath sounds: Normal breath sounds. No wheezing, rhonchi or rales. Musculoskeletal:      Cervical back: Normal range of motion and neck supple. Skin:     General: Skin is warm and dry. Capillary Refill: Capillary refill takes less than 2 seconds.    Neurological:      Mental Status: She is alert and oriented to person, place, and time.         Testing:     Orders Placed This Encounter   Procedures    POCT COVID-19, Antigen     Order Specific Question:   Is this test for diagnosis or screening? Answer:   Diagnosis of ill patient     Order Specific Question:   Symptomatic for COVID-19 as defined by CDC? Answer:   Yes     Order Specific Question:   Date of Symptom Onset     Answer:   9/13/2021     Order Specific Question:   Hospitalized for COVID-19? Answer:   No     Order Specific Question:   Admitted to ICU for COVID-19? Answer:   No     Order Specific Question:   Previously tested for COVID-19?      Answer:   No        Recent Results (from the past 24 hour(s))   POCT COVID-19, Antigen    Collection Time: 09/20/21 12:30 PM   Result Value Ref Range    SARS-COV-2, POC Not-Detected Not Detected    Lot Number [id number]     QC Pass/Fail pass     Performing [employer]

## 2021-11-23 ENCOUNTER — OFFICE VISIT (OUTPATIENT)
Dept: FAMILY MEDICINE CLINIC | Age: 61
End: 2021-11-23
Payer: COMMERCIAL

## 2021-11-23 VITALS
TEMPERATURE: 97.9 F | BODY MASS INDEX: 35.65 KG/M2 | HEART RATE: 78 BPM | HEIGHT: 72 IN | DIASTOLIC BLOOD PRESSURE: 66 MMHG | SYSTOLIC BLOOD PRESSURE: 134 MMHG | WEIGHT: 263.2 LBS | OXYGEN SATURATION: 97 % | RESPIRATION RATE: 16 BRPM

## 2021-11-23 DIAGNOSIS — Z00.00 ENCOUNTER FOR WELL ADULT EXAM WITHOUT ABNORMAL FINDINGS: Primary | ICD-10-CM

## 2021-11-23 DIAGNOSIS — Z00.00 ENCOUNTER FOR WELL ADULT EXAM WITHOUT ABNORMAL FINDINGS: ICD-10-CM

## 2021-11-23 LAB
ALBUMIN SERPL-MCNC: 4.7 G/DL (ref 3.5–5.2)
ALP BLD-CCNC: 76 U/L (ref 35–104)
ALT SERPL-CCNC: 24 U/L (ref 0–32)
ANION GAP SERPL CALCULATED.3IONS-SCNC: 22 MMOL/L (ref 7–16)
AST SERPL-CCNC: 25 U/L (ref 0–31)
BASOPHILS ABSOLUTE: 0.05 E9/L (ref 0–0.2)
BASOPHILS RELATIVE PERCENT: 0.6 % (ref 0–2)
BILIRUB SERPL-MCNC: 0.4 MG/DL (ref 0–1.2)
BUN BLDV-MCNC: 16 MG/DL (ref 6–20)
CALCIUM SERPL-MCNC: 10 MG/DL (ref 8.6–10.2)
CHLORIDE BLD-SCNC: 102 MMOL/L (ref 98–107)
CHOLESTEROL, TOTAL: 203 MG/DL (ref 0–199)
CO2: 19 MMOL/L (ref 22–29)
CREAT SERPL-MCNC: 0.8 MG/DL (ref 0.5–1)
EOSINOPHILS ABSOLUTE: 0.26 E9/L (ref 0.05–0.5)
EOSINOPHILS RELATIVE PERCENT: 3.3 % (ref 0–6)
GFR AFRICAN AMERICAN: >60
GFR NON-AFRICAN AMERICAN: >60 ML/MIN/1.73
GLUCOSE BLD-MCNC: 72 MG/DL (ref 74–99)
HCT VFR BLD CALC: 45.3 % (ref 34–48)
HDLC SERPL-MCNC: 61 MG/DL
HEMOGLOBIN: 14.4 G/DL (ref 11.5–15.5)
IMMATURE GRANULOCYTES #: 0.02 E9/L
IMMATURE GRANULOCYTES %: 0.3 % (ref 0–5)
LDL CHOLESTEROL CALCULATED: 105 MG/DL (ref 0–99)
LYMPHOCYTES ABSOLUTE: 2.06 E9/L (ref 1.5–4)
LYMPHOCYTES RELATIVE PERCENT: 26.1 % (ref 20–42)
MCH RBC QN AUTO: 28 PG (ref 26–35)
MCHC RBC AUTO-ENTMCNC: 31.8 % (ref 32–34.5)
MCV RBC AUTO: 88.1 FL (ref 80–99.9)
MONOCYTES ABSOLUTE: 0.75 E9/L (ref 0.1–0.95)
MONOCYTES RELATIVE PERCENT: 9.5 % (ref 2–12)
NEUTROPHILS ABSOLUTE: 4.76 E9/L (ref 1.8–7.3)
NEUTROPHILS RELATIVE PERCENT: 60.2 % (ref 43–80)
PDW BLD-RTO: 13.4 FL (ref 11.5–15)
PLATELET # BLD: 295 E9/L (ref 130–450)
PMV BLD AUTO: 10.4 FL (ref 7–12)
POTASSIUM SERPL-SCNC: 4.3 MMOL/L (ref 3.5–5)
RBC # BLD: 5.14 E12/L (ref 3.5–5.5)
SODIUM BLD-SCNC: 143 MMOL/L (ref 132–146)
TOTAL PROTEIN: 7.4 G/DL (ref 6.4–8.3)
TRIGL SERPL-MCNC: 184 MG/DL (ref 0–149)
VLDLC SERPL CALC-MCNC: 37 MG/DL
WBC # BLD: 7.9 E9/L (ref 4.5–11.5)

## 2021-11-23 PROCEDURE — 99396 PREV VISIT EST AGE 40-64: CPT | Performed by: FAMILY MEDICINE

## 2021-11-23 PROCEDURE — G8484 FLU IMMUNIZE NO ADMIN: HCPCS | Performed by: FAMILY MEDICINE

## 2021-11-23 ASSESSMENT — PATIENT HEALTH QUESTIONNAIRE - PHQ9
SUM OF ALL RESPONSES TO PHQ QUESTIONS 1-9: 0
2. FEELING DOWN, DEPRESSED OR HOPELESS: 0
SUM OF ALL RESPONSES TO PHQ QUESTIONS 1-9: 0
SUM OF ALL RESPONSES TO PHQ9 QUESTIONS 1 & 2: 0
SUM OF ALL RESPONSES TO PHQ QUESTIONS 1-9: 0
1. LITTLE INTEREST OR PLEASURE IN DOING THINGS: 0

## 2021-11-23 NOTE — Clinical Note
Need to find out Cscope follow up timeline from Dr. Karlee Mckeon in [place]. Have Cscope report, but no recommended FU timeline. Probably has this on her post-op office visit note.

## 2021-11-23 NOTE — PROGRESS NOTES
[facility]  Office Progress Note - Dr. Reina Kan  11/23/21    CC:   Chief Complaint   Patient presents with    Annual Exam        /66   Pulse 78   Temp 97.9 °F (36.6 °C) (Temporal)   Resp 16   Ht 6' 2\" (1.88 m)   Wt 263 lb 3.2 oz (119.4 kg)   SpO2 97%   BMI 33.79 kg/m²   Wt Readings from Last 3 Encounters:   11/23/21 263 lb 3.2 oz (119.4 kg)   09/20/21 263 lb (119.3 kg)   01/12/21 266 lb (120.7 kg)       HPI:   Patient presents for yearly physical.     Overall they are doing well. Concerns: none, feeling well. Weight reviewed. Body mass index is 33.79 kg/m². Wt Readings from Last 3 Encounters:   11/23/21 263 lb 3.2 oz (119.4 kg)   09/20/21 263 lb (119.3 kg)   01/12/21 266 lb (120.7 kg)     Patient is somewhat currently working on diet. Patient is not currently working on exercise. Vaccines reviewed. Discussed age appropriate vaccine recommendations. HM Reviewed. Discussed age appropriate HM topics. Patient was encouraged to update HM topics. Ordered where agreeable. Reviewed age appropriate anticipatory guidance. Recent labs reviewed include: none, ordered today    The 10-year ASCVD risk score (Emily Garnica., et al., 2013) is: 3%    Values used to calculate the score:      Age: 63 years      Sex: Female      Is Non- : No      Diabetic: No      Tobacco smoker: No      Systolic Blood Pressure: 222 mmHg      Is BP treated: No      HDL Cholesterol: 66 mg/dL      Total Cholesterol: 209 mg/dL    PMH, FH, SxHx, Social Hx reviewed and updated if needed. Need to find out recommended Cscope follow up.          _________________________________________________________    Assessment / Joana Ren was seen today for annual exam.    Diagnoses and all orders for this visit:    Encounter for well adult exam without abnormal findings  -     CBC Auto Differential; Future  -     Comprehensive Metabolic Panel;  Future  -     Lipid Panel; Future  Overall Js Betancur is doing well. Age appropriate HM topics reviewed and updated where agreeable. Vaccines reviewed and updated where agreeable. - Declines  Age appropriate anticipatory guidance discussed. Encouraged to work on [employer] and exercise strategies. Opportunity to ask questions and answers provided as able. Discussed mammo from last year. She was advised of need for follow up on a known nodule that has been evaluated in the past. She declined, and chooses to do 2-year mammo schedule. Understands that this could miss something harmful. Advised she is welcome to change her mind and let us know if we can schedule for her. Need to find out Cscope FU timeline from [facility] in [place]. Have report, but no recommended FU timeline. Recommend RTO in 1 year for annual physical.       Return in about 1 year (around 11/23/2022). or as scheduled. Patient counseled to follow up sooner or seek more acute care if symptoms worsening or not improving according to plan. Electronically signed by Khoa Low MD on 11/23/2021    _________________________________________________________  No current outpatient medications on file prior to visit. No current facility-administered medications on file prior to visit. Patient Active Problem List   Diagnosis Code    Newly recognized heart murmur R01.1    Breast nodule N63.0     _________________________________________________________  No past medical history on file.     Family History   Problem Relation Age of Onset    Heart Disease Mother 58    Cancer Father 79        Colon    Diabetes Father        Past Surgical History:   Procedure Laterality Date    HYSTERECTOMY      MANDIBLE FRACTURE SURGERY      VARICOSE VEIN SURGERY         Social History     Tobacco Use    Smoking status: Never Smoker    Smokeless tobacco: Never Used   Substance Use Topics    Alcohol use: No    Drug use: No       Chart reviewed and updated where appropriate for PMH, Fam, and Soc Hx.  _________________________________________________________  ROS: POSITIVE: As in the HPI. Otherwise Pertinent negatives are negative.    __________________________________________________________  Physical Exam   Constitutional:    She is oriented to person, place, and time. She appears well-developed and well-nourished. HENT:    Right Ear: normal Pinna, normal canal, normal TM. Left Ear: normal Pinna, normal canal, normal TM. Eyes:    Conjunctivae are normal.    Pupils are equal, round, and reactive to light. EOMI. Neck:    Normal range of motion. No thyromegaly or nodules noted. No bruit. No LAD. Cardiovascular:    Normal rate, regular rhythm and normal heart sounds. No murmur. No gallop and no friction rub. Pulmonary/Chest:    Effort normal and breath sounds normal.    No wheezes. No rales or rhonchi. Abdominal:    Soft. Bowel sounds are normal.    No distension. No tenderness. Musculoskeletal:    Normal range of motion. No joint swelling noted. No peripheral edema. Skin:    Skin is warm and dry. No rashes, No lesions. Psychiatric:    She has a normal mood and affect. Normal groom and dress. No SI or HI.   ________________________________________________________    This note may have been created using dictation software.  Efforts were made to reduce errors, but some may persist.

## 2021-11-23 NOTE — PATIENT INSTRUCTIONS
Patient Education        Well Visit, Women 48 to 72: Care Instructions  Overview     Well visits can help you stay healthy. Your doctor has checked your overall health and may have suggested ways to take good care of yourself. Your doctor also may have recommended tests. At home, you can help prevent illness with healthy eating, regular exercise, and other steps. Follow-up care is a key part of your treatment and safety. Be sure to make and go to all appointments, and call your doctor if you are having problems. It's also a good idea to know your test results and keep a list of the medicines you take. How can you care for yourself at home? · Get screening tests that you and your doctor decide on. Screening helps find diseases before any symptoms appear. · Eat healthy foods. Choose fruits, vegetables, whole grains, protein, and low-fat dairy foods. Limit fat, especially saturated fat. Reduce salt in your diet. · Limit alcohol. Have no more than 1 drink a day or 7 drinks a week. · Get at least 30 minutes of exercise on most days of the week. Walking is a good choice. You also may want to do other activities, such as running, swimming, cycling, or playing tennis or team sports. · Reach and stay at a healthy weight. This will lower your risk for many problems, such as obesity, diabetes, heart disease, and high blood pressure. · Do not smoke. Smoking can make health problems worse. If you need help quitting, talk to your doctor about stop-smoking programs and medicines. These can increase your chances of quitting for good. · Care for your mental health. It is easy to get weighed down by worry and stress. Learn strategies to manage stress, like deep breathing and mindfulness, and stay connected with your family and community. If you find you often feel sad or hopeless, talk with your doctor. Treatment can help.   · Talk to your doctor about whether you have any risk factors for sexually transmitted infections (STIs). You can help prevent STIs if you wait to have sex with a new partner (or partners) until you've each been tested for STIs. It also helps if you use condoms (male or female condoms) and if you limit your sex partners to one person who only has sex with you. Vaccines are available for some STIs. · If you think you may have a problem with alcohol or drug use, talk to your doctor. This includes prescription medicines (such as amphetamines and opioids) and illegal drugs (such as cocaine and methamphetamine). Your doctor can help you figure out what type of treatment is best for you. · Protect your skin from too much sun. When you're outdoors from 10 a.m. to 4 p.m., stay in the shade or cover up with clothing and a hat with a wide brim. Wear sunglasses that block UV rays. Even when it's cloudy, put broad-spectrum sunscreen (SPF 30 or higher) on any exposed skin. · See a dentist one or two times a year for checkups and to have your teeth cleaned. · Wear a seat belt in the car. When should you call for help? Watch closely for changes in your health, and be sure to contact your doctor if you have any problems or symptoms that concern you. Where can you learn more? Go to https://Dazo.[employer]. org and sign in to your [employer] account. Enter X391 in the [facility] box to learn more about \"Well Visit, Women 50 to 72: Care Instructions. \"     If you do not have an account, please click on the \"Sign Up Now\" link. Current as of: February 11, 2021               Content Version: 13.0  © 4106-9702 Healthwise, Incorporated. Care instructions adapted under license by [facility] ([facility]). If you have questions about a medical condition or this instruction, always ask your healthcare professional. [place] [address] any warranty or liability for your use of this information.

## 2021-11-29 ENCOUNTER — TELEPHONE (OUTPATIENT)
Dept: FAMILY MEDICINE CLINIC | Age: 61
End: 2021-11-29

## 2021-11-29 NOTE — TELEPHONE ENCOUNTER
Per office staff at Dr. Orlando Art    Her last c-scope was 02/05/2018 and it is recommended she repeat in 5 years. HM timeline updated    Closing encounter.

## 2021-11-29 NOTE — TELEPHONE ENCOUNTER
----- Message from Eddie Garcia MD sent at 11/23/2021  1:12 PM EST -----  Need to find out Cscope follow up timeline from Dr. Nahun Wild in [place]. Have Cscope report, but no recommended FU timeline. Probably has this on her post-op office visit note.

## 2021-12-27 ENCOUNTER — OFFICE VISIT (OUTPATIENT)
Dept: FAMILY MEDICINE CLINIC | Age: 61
End: 2021-12-27
Payer: COMMERCIAL

## 2021-12-27 VITALS
OXYGEN SATURATION: 95 % | HEIGHT: 72 IN | HEART RATE: 84 BPM | DIASTOLIC BLOOD PRESSURE: 82 MMHG | RESPIRATION RATE: 18 BRPM | WEIGHT: 264 LBS | BODY MASS INDEX: 35.76 KG/M2 | SYSTOLIC BLOOD PRESSURE: 122 MMHG | TEMPERATURE: 99.6 F

## 2021-12-27 DIAGNOSIS — R05.9 COUGH: Primary | ICD-10-CM

## 2021-12-27 DIAGNOSIS — R05.9 COUGH: ICD-10-CM

## 2021-12-27 LAB
Lab: NORMAL
PERFORMING INSTRUMENT: NORMAL
QC PASS/FAIL: NORMAL
SARS-COV-2, POC: NORMAL

## 2021-12-27 PROCEDURE — G8417 CALC BMI ABV UP PARAM F/U: HCPCS | Performed by: STUDENT IN AN ORGANIZED HEALTH CARE EDUCATION/TRAINING PROGRAM

## 2021-12-27 PROCEDURE — G8427 DOCREV CUR MEDS BY ELIG CLIN: HCPCS | Performed by: STUDENT IN AN ORGANIZED HEALTH CARE EDUCATION/TRAINING PROGRAM

## 2021-12-27 PROCEDURE — 99213 OFFICE O/P EST LOW 20 MIN: CPT | Performed by: STUDENT IN AN ORGANIZED HEALTH CARE EDUCATION/TRAINING PROGRAM

## 2021-12-27 PROCEDURE — 1036F TOBACCO NON-USER: CPT | Performed by: STUDENT IN AN ORGANIZED HEALTH CARE EDUCATION/TRAINING PROGRAM

## 2021-12-27 PROCEDURE — 87426 SARSCOV CORONAVIRUS AG IA: CPT | Performed by: STUDENT IN AN ORGANIZED HEALTH CARE EDUCATION/TRAINING PROGRAM

## 2021-12-27 PROCEDURE — G8484 FLU IMMUNIZE NO ADMIN: HCPCS | Performed by: STUDENT IN AN ORGANIZED HEALTH CARE EDUCATION/TRAINING PROGRAM

## 2021-12-27 PROCEDURE — 3017F COLORECTAL CA SCREEN DOC REV: CPT | Performed by: STUDENT IN AN ORGANIZED HEALTH CARE EDUCATION/TRAINING PROGRAM

## 2021-12-27 RX ORDER — BROMPHENIRAMINE MALEATE, PSEUDOEPHEDRINE HYDROCHLORIDE, AND DEXTROMETHORPHAN HYDROBROMIDE 2; 30; 10 MG/5ML; MG/5ML; MG/5ML
5 SYRUP ORAL 4 TIMES DAILY PRN
Qty: 118 ML | Refills: 0 | Status: SHIPPED
Start: 2021-12-27 | End: 2022-05-23

## 2021-12-27 RX ORDER — PREDNISONE 20 MG/1
20 TABLET ORAL 2 TIMES DAILY
Qty: 10 TABLET | Refills: 0 | Status: SHIPPED | OUTPATIENT
Start: 2021-12-27 | End: 2022-01-01

## 2021-12-27 ASSESSMENT — ENCOUNTER SYMPTOMS
VOMITING: 0
ABDOMINAL PAIN: 0
RHINORRHEA: 0
NAUSEA: 0
SHORTNESS OF BREATH: 0
COUGH: 1

## 2021-12-27 NOTE — PROGRESS NOTES
Itz Shahid (:  1960) is a 63 y.o. female,Established patient, here for evaluation of the following chief complaint(s):  Nasal Congestion (x 3 days ), Fatigue (x 3 days ), Cough (x 3 days ), Taste Change (& loss of smell x 3 days ), and Generalized Body Aches (x 3 days )         ASSESSMENT/PLAN:  1. Cough  -     POCT COVID-19, Antigen  -     brompheniramine-pseudoephedrine-DM 2-30-10 MG/5ML syrup; Take 5 mLs by mouth 4 times daily as needed for Congestion or Cough, Disp-118 mL, R-0Normal  -     predniSONE (DELTASONE) 20 MG tablet; Take 1 tablet by mouth 2 times daily for 5 days, Disp-10 tablet, R-0Normal  -     COVID-19 Ambulatory; Future  Due to loss of taste and smell and myalgias I will also send out even though her rapid was negative. I am concerned her rapid test might not be picking up on a cough variant. Will treat with steroids and symptomatically with Bromfed. Educated on signs and symptoms of clot and when to seek attention. Suggested taking 81 mg aspirin per day. Recommended taking vitamin C vitamin D and zinc. Discussed return and ER precautions. Patient  verbalized understanding      Return if symptoms worsen or fail to improve. Subjective   SUBJECTIVE/OBJECTIVE:  Nasal congestion, drainage  -she is tired and achy  -she has not had any contacts around her test positive  -she is not short of breath  -she does have a cough  -she has muscle and body aches  -does not have taste or smell  -no chest pain  -no history of smoking, COPD, asthma  -has not been taking much OTC  -no clotting history      Review of Systems   Constitutional: Negative for chills and fever. HENT: Positive for congestion. Negative for rhinorrhea. Respiratory: Positive for cough. Negative for shortness of breath. Cardiovascular: Negative for chest pain and leg swelling. Gastrointestinal: Negative for abdominal pain, nausea and vomiting. Genitourinary: Negative for dysuria and hematuria. Musculoskeletal: Positive for arthralgias and myalgias. Skin: Negative for rash and wound. Neurological: Negative for dizziness and light-headedness. Objective   Physical Exam  Vitals reviewed. Constitutional:       General: She is not in acute distress. HENT:      Head: Normocephalic and atraumatic. Eyes:      Extraocular Movements: Extraocular movements intact. Conjunctiva/sclera: Conjunctivae normal.   Cardiovascular:      Rate and Rhythm: Normal rate and regular rhythm. Pulmonary:      Effort: Pulmonary effort is normal.      Breath sounds: Normal breath sounds. No wheezing. Abdominal:      General: Abdomen is flat. There is no distension. Musculoskeletal:         General: No tenderness or deformity. Neurological:      General: No focal deficit present. Mental Status: She is alert and oriented to person, place, and time. An electronic signature was used to authenticate this note.     --Suad Pineda MD

## 2021-12-30 ENCOUNTER — TELEPHONE (OUTPATIENT)
Dept: FAMILY MEDICINE CLINIC | Age: 61
End: 2021-12-30

## 2021-12-30 LAB
SARS-COV-2: DETECTED
SOURCE: ABNORMAL

## 2021-12-30 NOTE — TELEPHONE ENCOUNTER
I expect her send out COVID to return this morning. If positive I don't think antibiotics will help. If negative I will send something in for her. Is she breathing OK?

## 2021-12-30 NOTE — TELEPHONE ENCOUNTER
Per Dr. Haider Orellana in result note \"Called and discussed treatment options with patient. She is not having any SOB. Main complaint is congestion and drainage. For now she is OK with watchful waiting. She states she will likely need a work note and she will let us know where to send it\"    Closing message will await pt's response on where to send Work Excuse.

## 2021-12-30 NOTE — TELEPHONE ENCOUNTER
Patient calling in she was seen 12/27/21. Her send out is pending. She was given bromfed and steroid. She is asking for a zpack to [facility]? She states she is still fevering.

## 2022-01-03 ENCOUNTER — TELEPHONE (OUTPATIENT)
Dept: FAMILY MEDICINE CLINIC | Age: 62
End: 2022-01-03

## 2022-01-03 NOTE — TELEPHONE ENCOUNTER
----- Message from Betzaida Carballo sent at 12/30/2021  1:45 PM EST -----  Subject: Message to Provider    QUESTIONS  Information for Provider? [facility] received a call from Pt Kalyan Ku? Pt needs PCP Carrie or Dr. Andrea Joiner to fax over a proof of a positive   COVID-19 test to [phone] with ATTN? CARL Abbasi. Pt saw Dr. Andrea Joiner   for COVID symptoms on 12/27/21 and was tested that day twice. Pt has   nothing further to add at this time. ---------------------------------------------------------------------------  --------------  Lear How INFO  What is the best way for the office to contact you? OK to leave message on   voicemail  Preferred Call Back Phone Number? [phone]  ---------------------------------------------------------------------------  --------------  SCRIPT ANSWERS  Relationship to Patient?  Self

## 2022-01-05 ENCOUNTER — TELEPHONE (OUTPATIENT)
Dept: FAMILY MEDICINE CLINIC | Age: 62
End: 2022-01-05

## 2022-01-05 NOTE — TELEPHONE ENCOUNTER
----- Message from Kimi Alex sent at 1/5/2022  3:16 PM EST -----  Subject: Message to Provider    QUESTIONS  Information for Provider? patient needs a return to work note on Monday   fax over to her job the number is [phone] kristie [employer] in [place]   ---------------------------------------------------------------------------  --------------  [address]  What is the best way for the office to contact you? OK to leave message on   voicemail  Preferred Call Back Phone Number? [phone]  ---------------------------------------------------------------------------  --------------  SCRIPT ANSWERS  Relationship to Patient?  Self

## 2022-01-06 NOTE — TELEPHONE ENCOUNTER
Faxing letter to given fax number. Pt can return to work Monday. Detailed message left advising pt of this.

## 2022-01-14 ENCOUNTER — OFFICE VISIT (OUTPATIENT)
Dept: FAMILY MEDICINE CLINIC | Age: 62
End: 2022-01-14
Payer: COMMERCIAL

## 2022-01-14 VITALS
BODY MASS INDEX: 35.21 KG/M2 | DIASTOLIC BLOOD PRESSURE: 80 MMHG | WEIGHT: 260 LBS | HEIGHT: 72 IN | TEMPERATURE: 98 F | HEART RATE: 87 BPM | SYSTOLIC BLOOD PRESSURE: 134 MMHG | OXYGEN SATURATION: 98 %

## 2022-01-14 DIAGNOSIS — U07.1 COVID-19: Primary | ICD-10-CM

## 2022-01-14 PROCEDURE — G8417 CALC BMI ABV UP PARAM F/U: HCPCS | Performed by: FAMILY MEDICINE

## 2022-01-14 PROCEDURE — 1036F TOBACCO NON-USER: CPT | Performed by: FAMILY MEDICINE

## 2022-01-14 PROCEDURE — G8484 FLU IMMUNIZE NO ADMIN: HCPCS | Performed by: FAMILY MEDICINE

## 2022-01-14 PROCEDURE — 3017F COLORECTAL CA SCREEN DOC REV: CPT | Performed by: FAMILY MEDICINE

## 2022-01-14 PROCEDURE — G8427 DOCREV CUR MEDS BY ELIG CLIN: HCPCS | Performed by: FAMILY MEDICINE

## 2022-01-14 PROCEDURE — 99213 OFFICE O/P EST LOW 20 MIN: CPT | Performed by: FAMILY MEDICINE

## 2022-01-14 RX ORDER — GUAIFENESIN 600 MG/1
600 TABLET, EXTENDED RELEASE ORAL 2 TIMES DAILY
COMMUNITY
End: 2022-05-23

## 2022-01-14 NOTE — LETTER
[address] [place] [address] Drive  [address] [place] [address]  Phone: [phone]  Fax: [phone] [facility] MD Geovanni        January 14, 2022     Patient: Tessie Culver   YOB: 1960   Date of Visit: 1/14/2022       To Whom it May Concern:    Tessie Culver was seen in my clinic on 1/14/2022. She may return to work on Tuesday January 18th. If you have any questions or concerns, please don't hesitate to call.     Sincerely,         Soni White MD

## 2022-01-14 NOTE — PROGRESS NOTES
[facility]  Office Progress Note - Dr. Emil Reyes  1/14/22    CC:   Chief Complaint   Patient presents with    Post-COVID Symptoms     Weakness/fatigue, no taste or smell, chest congestion. Pt states on the last day of her steriod, she broke out in hives. /80 (Site: Left Upper Arm, Position: Sitting, Cuff Size: Large Adult)   Pulse 87   Temp 98 °F (36.7 °C) (Temporal)   Ht 6' 2\" (1.88 m)   Wt 260 lb (117.9 kg)   SpO2 98%   BMI 33.38 kg/m²   Wt Readings from Last 3 Encounters:   01/14/22 260 lb (117.9 kg)   12/27/21 264 lb (119.7 kg)   11/23/21 263 lb 3.2 oz (119.4 kg)       HPI: Dx with covid on 12/27. Feeling fatigued, achy, and still no taste or smell. Mild coughing. Has beenTx with mucinex which has been helping. Discussed, counseled      Fax : [phone] - sonya burdick. _________________________________________________________    Assessment / Cathie Lori was seen today for post-covid symptoms. Diagnoses and all orders for this visit:    COVID-19    She is very likely no longer contagious but still having some lingering symptoms. Explained that this is a normal experience of COVID. We cannot predict when she is going to be better. Would expect continued improvement over the next couple of weeks. Taste and smell changes can last for months. She was able to work for a few days but feels very exhausted. I am going to give her an extra day or 2 off and let her recuperate. We will see how she does next week working 4 days in a row. As needed or as scheduled. Patient counseled to follow up sooner or seek more acute care if symptoms worsening or not improving according to plan.      Electronically signed by Mark Veloz MD on 1/14/2022    _________________________________________________________  Current Outpatient Medications on File Prior to Visit   Medication Sig Dispense Refill    guaiFENesin ([place] 598) 600 MG extended release tablet Take 600 mg by mouth 2 times daily      brompheniramine-pseudoephedrine-DM 2-30-10 MG/5ML syrup Take 5 mLs by mouth 4 times daily as needed for Congestion or Cough 118 mL 0     No current facility-administered medications on file prior to visit. Patient Active Problem List   Diagnosis Code    Newly recognized heart murmur R01.1    Breast nodule N63.0     _________________________________________________________  No past medical history on file. Family History   Problem Relation Age of Onset    Heart Disease Mother 58    Cancer Father 79        Colon    Diabetes Father        Past Surgical History:   Procedure Laterality Date    HYSTERECTOMY      MANDIBLE FRACTURE SURGERY      VARICOSE VEIN SURGERY         Social History     Tobacco Use    Smoking status: Never Smoker    Smokeless tobacco: Never Used   Substance Use Topics    Alcohol use: No    Drug use: No       Chart reviewed and updated where appropriate for PMH, Fam, and Soc Hx.  _________________________________________________________  ROS: POSITIVE: As in the HPI. Otherwise Pertinent negatives are negative.    __________________________________________________________  Physical Exam   Constitutional:    She is oriented to person, place, and time. She appears well-developed and well-nourished. Eyes:    Conjunctivae are normal.    Pupils are equal, round, and reactive to light. EOMI. Neck:    Normal range of motion. No thyromegaly or nodules noted. No bruit. No LAD. Cardiovascular:    Normal rate, regular rhythm and normal heart sounds. No murmur. No gallop and no friction rub. Pulmonary/Chest:    Effort normal and breath sounds normal.    No wheezes. No rales or rhonchi. Abdominal:    Soft. Bowel sounds are normal.    No distension. No tenderness. Skin:    Skin is warm and dry. No rashes, No lesions. Psychiatric:    She has a normal mood and affect. Normal groom and dress. No SI or HI. ________________________________________________________    This note may have been created using dictation software.  Efforts were made to reduce errors, but some may persist.

## 2022-05-23 ENCOUNTER — OFFICE VISIT (OUTPATIENT)
Dept: FAMILY MEDICINE CLINIC | Age: 62
End: 2022-05-23
Payer: COMMERCIAL

## 2022-05-23 VITALS
DIASTOLIC BLOOD PRESSURE: 74 MMHG | HEIGHT: 72 IN | OXYGEN SATURATION: 96 % | TEMPERATURE: 97.2 F | BODY MASS INDEX: 36.03 KG/M2 | WEIGHT: 266 LBS | HEART RATE: 67 BPM | SYSTOLIC BLOOD PRESSURE: 136 MMHG | RESPIRATION RATE: 20 BRPM

## 2022-05-23 DIAGNOSIS — T30.0 BURN: Primary | ICD-10-CM

## 2022-05-23 PROCEDURE — 1036F TOBACCO NON-USER: CPT | Performed by: NURSE PRACTITIONER

## 2022-05-23 PROCEDURE — 99213 OFFICE O/P EST LOW 20 MIN: CPT | Performed by: NURSE PRACTITIONER

## 2022-05-23 PROCEDURE — 3017F COLORECTAL CA SCREEN DOC REV: CPT | Performed by: NURSE PRACTITIONER

## 2022-05-23 PROCEDURE — G8427 DOCREV CUR MEDS BY ELIG CLIN: HCPCS | Performed by: NURSE PRACTITIONER

## 2022-05-23 PROCEDURE — G8417 CALC BMI ABV UP PARAM F/U: HCPCS | Performed by: NURSE PRACTITIONER

## 2022-05-23 NOTE — LETTER
[facility]  [address] [place] [address]  Phone: [phone]  Fax: [phone]    IAN Singh NP        May 23, 2022     Patient: Bruce Wellington   YOB: 1960   Date of Visit: 5/23/2022       To Whom It May Concern: It is my medical opinion that Bruce Wellington may return to work on 05/26/2022. If you have any questions or concerns, please don't hesitate to call.     Sincerely,        IAN Singh NP
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
## 2022-06-08 ENCOUNTER — OFFICE VISIT (OUTPATIENT)
Dept: FAMILY MEDICINE CLINIC | Age: 62
End: 2022-06-08
Payer: COMMERCIAL

## 2022-06-08 VITALS
SYSTOLIC BLOOD PRESSURE: 128 MMHG | DIASTOLIC BLOOD PRESSURE: 82 MMHG | WEIGHT: 265 LBS | OXYGEN SATURATION: 95 % | TEMPERATURE: 98.4 F | HEIGHT: 72 IN | BODY MASS INDEX: 35.89 KG/M2 | RESPIRATION RATE: 18 BRPM | HEART RATE: 70 BPM

## 2022-06-08 DIAGNOSIS — R10.32 LLQ PAIN: Primary | ICD-10-CM

## 2022-06-08 DIAGNOSIS — R30.0 DYSURIA: ICD-10-CM

## 2022-06-08 LAB
BILIRUBIN, POC: NORMAL
BLOOD URINE, POC: NORMAL
CLARITY, POC: CLEAR
COLOR, POC: YELLOW
GLUCOSE URINE, POC: NORMAL
KETONES, POC: NORMAL
LEUKOCYTE EST, POC: NORMAL
NITRITE, POC: NORMAL
PH, POC: 5
PROTEIN, POC: NORMAL
SPECIFIC GRAVITY, POC: 1.02
UROBILINOGEN, POC: NORMAL

## 2022-06-08 PROCEDURE — G8417 CALC BMI ABV UP PARAM F/U: HCPCS | Performed by: NURSE PRACTITIONER

## 2022-06-08 PROCEDURE — 81002 URINALYSIS NONAUTO W/O SCOPE: CPT | Performed by: NURSE PRACTITIONER

## 2022-06-08 PROCEDURE — 99213 OFFICE O/P EST LOW 20 MIN: CPT | Performed by: NURSE PRACTITIONER

## 2022-06-08 PROCEDURE — G8427 DOCREV CUR MEDS BY ELIG CLIN: HCPCS | Performed by: NURSE PRACTITIONER

## 2022-06-08 PROCEDURE — 1036F TOBACCO NON-USER: CPT | Performed by: NURSE PRACTITIONER

## 2022-06-08 PROCEDURE — 3017F COLORECTAL CA SCREEN DOC REV: CPT | Performed by: NURSE PRACTITIONER

## 2022-06-08 NOTE — PROGRESS NOTES
Chief Complaint:       diverticulitis     History of Present Illness   Source of history provided by:  patient. Brandon Bain is a 63 y.o. old female who presents to walk-in for complaints of gradual onset aching pain LLQ and left lower back without radiation which began 3 day(s) prior to arrival.   There has been similar episodes in the past and she is gets \" a pain pill and antibiotic and the symptoms go away. \"  Since onset the symptoms have been persistent. The pain is associated with nothing pertinent. The pain is aggravated by nothing and relieved by nothing. There has been NO chills, cloudy urine, diarrhea, dysuria, headache, hematuria, sweating, urinary frequency, urinary incontinence, urinary urgency, vaginal discharge, vaginal itching or vomiting. ROS    Unless otherwise stated in this report or unable to obtain because of the patient's clinical or mental status as evidenced by the medical record, this patients's positive and negative responses for Review of Systems, constitutional, psych, eyes, ENT, cardiovascular, respiratory, gastrointestinal, neurological, genitourinary, musculoskeletal, integument systems and systems related to the presenting problem are either stated in the preceding or were not pertinent or were negative for the symptoms and/or complaints related to the medical problem. Past Medical History:  has no past medical history on file. Past Surgical History:  has a past surgical history that includes Hysterectomy; Varicose vein surgery; and Mandible fracture surgery. Social History:  reports that she has never smoked. She has never used smokeless tobacco. She reports that she does not drink alcohol and does not use drugs. Family History: family history includes Cancer (age of onset: 79) in her father; Diabetes in her father; Heart Disease (age of onset: 58) in her mother.    Allergies: Pyridium [phenazopyridine hcl]    Physical Exam   Vital Signs:  /82   Pulse 70   Temp 98.4 °F (36.9 °C) (Temporal)   Resp 18   Ht 6' 2\" (1.88 m)   Wt 265 lb (120.2 kg)   SpO2 95%   BMI 34.02 kg/m²    Oxygen Saturation Interpretation: Normal.    General Appearance/Constitutional:  Alert, development consistent with age, NAD. Lungs: CTAB without wheezing, rales, or rhonchi. Heart:  RRR, no murmurs, rubs, or gallops. Abdomen:         General Appearance: No obvious trauma or bruising. No rashes or lesions. Bowel sounds: Present in all quadrants       Distension:  No distension. Tenderness: Abdomen is soft nontender, nondistended. There is no appreciable left lower quadrant tenderness. Liver/Spleen: Non-tender and no hepatosplenomegaly. Pulsatile Mass: None noted. Back: CVA Tenderness: No bilateral tenderness or bruising. Skin:  Normal turgor. Warm, dry, without visible rash, unless noted elsewhere. Neurological:  Orientation age-appropriate. Motor functions intact. Lab / Imaging Results   (All laboratory and radiology results have been personally reviewed by myself)  Labs:  Results for orders placed or performed in visit on 06/08/22   POCT Urinalysis no Micro   Result Value Ref Range    Color, UA yellow     Clarity, UA clear     Glucose, UA POC neg     Bilirubin, UA neg     Ketones, UA trace     Spec Grav, UA 1.025     Blood, UA POC neg     pH, UA 5.0     Protein, UA POC neg     Urobilinogen, UA 0.2 eu/dl     Leukocytes, UA small     Nitrite, UA neg        Imaging: All Radiology results interpreted by Radiologist unless otherwise noted. No results found. Assessment / Plan   Impression(s):  Apryl Wilson was seen today for other. Diagnoses and all orders for this visit:    LLQ pain  -     XR ABDOMEN (2 VIEWS); Future  -     POCT Urinalysis no Micro    Dysuria  -     Culture, Urine; Future      UA in office revealed trace ketones and a small amount of leukocytes, will send for urine culture.   I did educate the patient regarding symptoms of diverticulitis and I did not feel that antibiotics were warranted at this time given that symptoms and exam are inconsistent with diverticulitis. Will obtain KUB, will call with results. Increase fluids and rest. Clear liquids progressing to [employer] as tolerated. Advise f/u with PCP in 3-5 days if symptoms persist. ED sooner if symptoms worsen or change. ED immediately with the development of high or refractory fever, severe or worsening abdominal pain, hematochezia, coffee-ground emesis, bloody stools, lethargy, CP, or SOB. Pt states understanding and is in agreement with this care plan. All questions answered. No follow-ups on file. Electronically signed by IAN Duncan NP   DD: 6/8/22    **This report was transcribed using voice recognition software. Every effort was made to ensure accuracy; however, inadvertent computerized transcription errors may be present.

## 2022-06-09 RX ORDER — MAGNESIUM CARB/ALUMINUM HYDROX 105-160MG
296 TABLET,CHEWABLE ORAL ONCE
Qty: 296 ML | Refills: 0 | Status: SHIPPED | OUTPATIENT
Start: 2022-06-09 | End: 2022-06-09

## 2022-06-09 RX ORDER — POLYETHYLENE GLYCOL 3350 17 G/17G
17 POWDER, FOR SOLUTION ORAL DAILY PRN
Qty: 1530 G | Refills: 0 | Status: SHIPPED | OUTPATIENT
Start: 2022-06-09 | End: 2022-07-09

## 2022-06-09 NOTE — RESULT ENCOUNTER NOTE
X-ray is consistent with constipation. I did prescribe magnesium citrate for her to take once. She also should take MiraLAX on a daily basis to help regulate bowels. Increase activity and fluids.

## 2022-06-10 LAB — URINE CULTURE, ROUTINE: NORMAL

## 2022-06-27 ENCOUNTER — OFFICE VISIT (OUTPATIENT)
Dept: FAMILY MEDICINE CLINIC | Age: 62
End: 2022-06-27
Payer: COMMERCIAL

## 2022-06-27 VITALS
TEMPERATURE: 98.6 F | DIASTOLIC BLOOD PRESSURE: 96 MMHG | BODY MASS INDEX: 34.67 KG/M2 | OXYGEN SATURATION: 98 % | WEIGHT: 256 LBS | SYSTOLIC BLOOD PRESSURE: 150 MMHG | HEART RATE: 61 BPM | HEIGHT: 72 IN

## 2022-06-27 DIAGNOSIS — K13.70 ORAL MUCOSAL LESION: ICD-10-CM

## 2022-06-27 DIAGNOSIS — R53.82 CHRONIC FATIGUE: ICD-10-CM

## 2022-06-27 DIAGNOSIS — R53.82 CHRONIC FATIGUE: Primary | ICD-10-CM

## 2022-06-27 LAB
ALBUMIN SERPL-MCNC: 4.6 G/DL (ref 3.5–5.2)
ALP BLD-CCNC: 77 U/L (ref 35–104)
ALT SERPL-CCNC: 26 U/L (ref 0–32)
ANION GAP SERPL CALCULATED.3IONS-SCNC: 14 MMOL/L (ref 7–16)
AST SERPL-CCNC: 23 U/L (ref 0–31)
BASOPHILS ABSOLUTE: 0.03 E9/L (ref 0–0.2)
BASOPHILS RELATIVE PERCENT: 0.4 % (ref 0–2)
BILIRUB SERPL-MCNC: 0.4 MG/DL (ref 0–1.2)
BUN BLDV-MCNC: 13 MG/DL (ref 6–23)
CALCIUM SERPL-MCNC: 9.6 MG/DL (ref 8.6–10.2)
CHLORIDE BLD-SCNC: 102 MMOL/L (ref 98–107)
CO2: 24 MMOL/L (ref 22–29)
CREAT SERPL-MCNC: 0.9 MG/DL (ref 0.5–1)
EOSINOPHILS ABSOLUTE: 0.27 E9/L (ref 0.05–0.5)
EOSINOPHILS RELATIVE PERCENT: 3.7 % (ref 0–6)
FOLATE: >20 NG/ML (ref 4.8–24.2)
GFR AFRICAN AMERICAN: >60
GFR NON-AFRICAN AMERICAN: >60 ML/MIN/1.73
GLUCOSE BLD-MCNC: 81 MG/DL (ref 74–99)
HCT VFR BLD CALC: 46.2 % (ref 34–48)
HEMOGLOBIN: 14.4 G/DL (ref 11.5–15.5)
IMMATURE GRANULOCYTES #: 0.02 E9/L
IMMATURE GRANULOCYTES %: 0.3 % (ref 0–5)
LYMPHOCYTES ABSOLUTE: 1.66 E9/L (ref 1.5–4)
LYMPHOCYTES RELATIVE PERCENT: 22.6 % (ref 20–42)
MCH RBC QN AUTO: 27.9 PG (ref 26–35)
MCHC RBC AUTO-ENTMCNC: 31.2 % (ref 32–34.5)
MCV RBC AUTO: 89.4 FL (ref 80–99.9)
MONOCYTES ABSOLUTE: 0.71 E9/L (ref 0.1–0.95)
MONOCYTES RELATIVE PERCENT: 9.6 % (ref 2–12)
NEUTROPHILS ABSOLUTE: 4.67 E9/L (ref 1.8–7.3)
NEUTROPHILS RELATIVE PERCENT: 63.4 % (ref 43–80)
PDW BLD-RTO: 14.1 FL (ref 11.5–15)
PLATELET # BLD: 277 E9/L (ref 130–450)
PMV BLD AUTO: 10.5 FL (ref 7–12)
POTASSIUM SERPL-SCNC: 4.3 MMOL/L (ref 3.5–5)
RBC # BLD: 5.17 E12/L (ref 3.5–5.5)
SODIUM BLD-SCNC: 140 MMOL/L (ref 132–146)
TOTAL PROTEIN: 8 G/DL (ref 6.4–8.3)
TSH SERPL DL<=0.05 MIU/L-ACNC: 1.94 UIU/ML (ref 0.27–4.2)
VITAMIN B-12: 677 PG/ML (ref 211–946)
VITAMIN D 25-HYDROXY: 49 NG/ML (ref 30–100)
WBC # BLD: 7.4 E9/L (ref 4.5–11.5)

## 2022-06-27 PROCEDURE — 3017F COLORECTAL CA SCREEN DOC REV: CPT | Performed by: FAMILY MEDICINE

## 2022-06-27 PROCEDURE — G8427 DOCREV CUR MEDS BY ELIG CLIN: HCPCS | Performed by: FAMILY MEDICINE

## 2022-06-27 PROCEDURE — G8417 CALC BMI ABV UP PARAM F/U: HCPCS | Performed by: FAMILY MEDICINE

## 2022-06-27 PROCEDURE — 99213 OFFICE O/P EST LOW 20 MIN: CPT | Performed by: FAMILY MEDICINE

## 2022-06-27 PROCEDURE — 1036F TOBACCO NON-USER: CPT | Performed by: FAMILY MEDICINE

## 2022-06-27 ASSESSMENT — PATIENT HEALTH QUESTIONNAIRE - PHQ9
SUM OF ALL RESPONSES TO PHQ QUESTIONS 1-9: 0
1. LITTLE INTEREST OR PLEASURE IN DOING THINGS: 0
SUM OF ALL RESPONSES TO PHQ QUESTIONS 1-9: 0
SUM OF ALL RESPONSES TO PHQ9 QUESTIONS 1 & 2: 0
SUM OF ALL RESPONSES TO PHQ QUESTIONS 1-9: 0
SUM OF ALL RESPONSES TO PHQ QUESTIONS 1-9: 0
2. FEELING DOWN, DEPRESSED OR HOPELESS: 0

## 2022-06-27 NOTE — PROGRESS NOTES
[facility]  Office Progress Note - Dr. Joel Cheema  6/27/22    CC:   Chief Complaint   Patient presents with    Mouth Lesions     Sores in mouth    Fatigue     Residual fatigue from having covid in dec, still no smell or taste. BP (!) 150/96 (Site: Left Upper Arm, Position: Sitting, Cuff Size: Large Adult)   Pulse 61   Temp 98.6 °F (37 °C) (Temporal)   Ht 6' 2\" (1.88 m)   Wt 256 lb (116.1 kg)   SpO2 98%   BMI 32.87 kg/m²   Wt Readings from Last 3 Encounters:   06/27/22 256 lb (116.1 kg)   06/08/22 265 lb (120.2 kg)   05/23/22 266 lb (120.7 kg)       HPI:   She has had persistent fatigue, loss of hair - improved, and changed taste and smell. Smell hasnt really come back. Taste is off for a lot of things. She has also had some mouth sores that seem to be worse with acidic foods. Not painful at baselines. Roughness/whiteness and little blisters. All started around time she had covid in fall/winter of 2021. Usually goes to sleep about 6=9pm, back to sleep at 10 til 3am. Starts work at 4:30 am.      No anginal symptoms. 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 _________________________________________________________    Assessment / Radha Carballo was seen today for mouth lesions and fatigue. Diagnoses and all orders for this visit:    Chronic fatigue  -     CBC with Auto Differential; Future  -     Comprehensive Metabolic Panel; Future  -     TSH; Future  -     Vitamin B12 & Folate; Future  -     Vitamin D 25 Hydroxy; Future  Since had covid infection. 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 Will check labs to r/o other problems. Consider LEANNE and overworking. Given letter to limit work to no more than 50 hours weekly. Oral mucosal lesion  -     Magic Mouthwash (MIRACLE MOUTHWASH); Swish and spit 5 mLs 4 times daily as needed for Irritation  Extensive buccal mucosa irritation. FU with dental for Bx if persisting. prn or as scheduled. Patient counseled to follow up sooner or seek more acute care if symptoms worsening or not improving according to plan. Electronically signed by Dylon Stern MD on 6/28/2022    _________________________________________________________  Current Outpatient Medications on File Prior to Visit   Medication Sig Dispense Refill    polyethylene glycol (GLYCOLAX) 17 GM/SCOOP powder Take 17 g by mouth daily as needed (constipation) 1530 g 0     No current facility-administered medications on file prior to visit. Patient Active Problem List   Diagnosis Code    Newly recognized heart murmur R01.1    Breast nodule N63.0     _________________________________________________________  No past medical history on file. Family History   Problem Relation Age of Onset    Heart Disease Mother 58    Cancer Father 79        Colon    Diabetes Father        Past Surgical History:   Procedure Laterality Date    HYSTERECTOMY (CERVIX STATUS UNKNOWN)      MANDIBLE FRACTURE SURGERY      VARICOSE VEIN SURGERY         Social History     Tobacco Use    Smoking status: Never Smoker    Smokeless tobacco: Never Used   Substance Use Topics    Alcohol use: No    Drug use: No       Chart reviewed and updated where appropriate for PMH, Fam, and Soc Hx.  _________________________________________________________  ROS: POSITIVE: As in the HPI. Otherwise Pertinent negatives are negative.    __________________________________________________________  Physical Exam   Constitutional:    She is oriented to person, place, and time. She appears well-developed and well-nourished. HENT:    Nose: Nose normal.    Mouth/Throat: Oropharynx is clear and moist. Extensive irritate debrided buccal mucosa BL. Eyes:    Conjunctivae are normal.    Pupils are equal, round, and reactive to light. EOMI. Neck:    Normal range of motion. No thyromegaly or nodules noted. No bruit. No LAD.   Cardiovascular:    Normal rate, regular rhythm and normal heart sounds. No murmur. No gallop and no friction rub. Pulmonary/Chest:    Effort normal and breath sounds normal.    No wheezes. No rales or rhonchi. Abdominal:    Soft. Bowel sounds are normal.    No distension. No tenderness. Skin:    Skin is warm and dry. No rashes, No lesions. Psychiatric:    She has a normal mood and affect. Normal groom and dress. No SI or HI.   ________________________________________________________    This note may have been created using dictation software.  Efforts were made to reduce errors, but some may persist.

## 2022-07-07 ENCOUNTER — TELEPHONE (OUTPATIENT)
Dept: FAMILY MEDICINE CLINIC | Age: 62
End: 2022-07-07

## 2022-07-07 NOTE — TELEPHONE ENCOUNTER
Patient was Put on a 50 hour restriction for work. They said Its better she get on FMLA and this paperwork was faxed over to us. Have we seen this? She works at [id number] in [place].

## 2022-07-21 NOTE — TELEPHONE ENCOUNTER
Gretta Idalia called today and was wondering what the status of her [facility] paperwork is. Needs to be faxed to [phone] attn mendy crooks at [facility] in [place]. Please advise.  Thank you

## 2022-07-22 NOTE — TELEPHONE ENCOUNTER
Faxed, scanned into chart and mailing original to patient. Left a message at the home number to update.

## 2023-02-27 ENCOUNTER — OFFICE VISIT (OUTPATIENT)
Dept: FAMILY MEDICINE CLINIC | Age: 63
End: 2023-02-27
Payer: COMMERCIAL

## 2023-02-27 VITALS
HEIGHT: 72 IN | WEIGHT: 248 LBS | BODY MASS INDEX: 33.59 KG/M2 | SYSTOLIC BLOOD PRESSURE: 144 MMHG | HEART RATE: 79 BPM | DIASTOLIC BLOOD PRESSURE: 92 MMHG | OXYGEN SATURATION: 97 % | TEMPERATURE: 98.6 F

## 2023-02-27 DIAGNOSIS — E78.00 ELEVATED LDL CHOLESTEROL LEVEL: ICD-10-CM

## 2023-02-27 DIAGNOSIS — Z00.00 ENCOUNTER FOR WELL ADULT EXAM WITHOUT ABNORMAL FINDINGS: Primary | ICD-10-CM

## 2023-02-27 DIAGNOSIS — Z00.00 ENCOUNTER FOR WELL ADULT EXAM WITHOUT ABNORMAL FINDINGS: ICD-10-CM

## 2023-02-27 DIAGNOSIS — Z12.31 ENCOUNTER FOR MAMMOGRAM TO ESTABLISH BASELINE MAMMOGRAM: ICD-10-CM

## 2023-02-27 LAB
ANION GAP SERPL CALCULATED.3IONS-SCNC: 12 MMOL/L (ref 7–16)
BUN BLDV-MCNC: 14 MG/DL (ref 6–23)
CALCIUM SERPL-MCNC: 9.6 MG/DL (ref 8.6–10.2)
CHLORIDE BLD-SCNC: 102 MMOL/L (ref 98–107)
CHOLESTEROL, TOTAL: 193 MG/DL (ref 0–199)
CO2: 27 MMOL/L (ref 22–29)
CREAT SERPL-MCNC: 0.8 MG/DL (ref 0.5–1)
GFR SERPL CREATININE-BSD FRML MDRD: >60 ML/MIN/1.73
GLUCOSE BLD-MCNC: 93 MG/DL (ref 74–99)
HCT VFR BLD CALC: 47.5 % (ref 34–48)
HDLC SERPL-MCNC: 60 MG/DL
HEMOGLOBIN: 15 G/DL (ref 11.5–15.5)
LDL CHOLESTEROL CALCULATED: 106 MG/DL (ref 0–99)
MCH RBC QN AUTO: 27.9 PG (ref 26–35)
MCHC RBC AUTO-ENTMCNC: 31.6 % (ref 32–34.5)
MCV RBC AUTO: 88.3 FL (ref 80–99.9)
PDW BLD-RTO: 13.3 FL (ref 11.5–15)
PLATELET # BLD: 287 E9/L (ref 130–450)
PMV BLD AUTO: 10.7 FL (ref 7–12)
POTASSIUM SERPL-SCNC: 4.5 MMOL/L (ref 3.5–5)
RBC # BLD: 5.38 E12/L (ref 3.5–5.5)
SODIUM BLD-SCNC: 141 MMOL/L (ref 132–146)
TRIGL SERPL-MCNC: 135 MG/DL (ref 0–149)
VLDLC SERPL CALC-MCNC: 27 MG/DL
WBC # BLD: 6.2 E9/L (ref 4.5–11.5)

## 2023-02-27 PROCEDURE — 99396 PREV VISIT EST AGE 40-64: CPT | Performed by: FAMILY MEDICINE

## 2023-02-27 PROCEDURE — G8484 FLU IMMUNIZE NO ADMIN: HCPCS | Performed by: FAMILY MEDICINE

## 2023-02-27 ASSESSMENT — PATIENT HEALTH QUESTIONNAIRE - PHQ9
SUM OF ALL RESPONSES TO PHQ QUESTIONS 1-9: 0
1. LITTLE INTEREST OR PLEASURE IN DOING THINGS: 0
SUM OF ALL RESPONSES TO PHQ9 QUESTIONS 1 & 2: 0
SUM OF ALL RESPONSES TO PHQ QUESTIONS 1-9: 0
SUM OF ALL RESPONSES TO PHQ QUESTIONS 1-9: 0
2. FEELING DOWN, DEPRESSED OR HOPELESS: 0
SUM OF ALL RESPONSES TO PHQ QUESTIONS 1-9: 0

## 2023-02-27 NOTE — PATIENT INSTRUCTIONS
Call Dr. Aaron Edward - now with [facility] gastroenterology - to see when you should have your next colonoscopy.

## 2023-02-27 NOTE — PROGRESS NOTES
[facility]  Office Progress Note - Dr. Autumn French  2/27/23    CC:   Chief Complaint   Patient presents with    Annual Exam        BP (!) 144/92   Pulse 79   Temp 98.6 °F (37 °C) (Temporal)   Ht 6' 2\" (1.88 m)   Wt 248 lb (112.5 kg)   SpO2 97%   BMI 31.84 kg/m²   Wt Readings from Last 3 Encounters:   02/27/23 248 lb (112.5 kg)   06/27/22 256 lb (116.1 kg)   06/08/22 265 lb (120.2 kg)       HPI: Patient presents for yearly physical.     Overall they are doing well. Concerns:     Check up. Derm removed a skin cancer from her back last November. SCC. Taste and smell finally improving a little. Mouth sores previously have healed. Fatigue has been improved since summer visit - hasnt been working 60 hours a week or so recently. Son got  and she was inducted in [employer] [employer] [employer] for basketball since last seen. Elevated BP today again. At first.       Weight reviewed. Body mass index is 31.84 kg/m². Wt Readings from Last 3 Encounters:   02/27/23 248 lb (112.5 kg)   06/27/22 256 lb (116.1 kg)   06/08/22 265 lb (120.2 kg)     Patient is currently working on diet. Patient is not currently working on exercise. Vaccines reviewed. Discussed age appropriate vaccine recommendations. HM Reviewed. Discussed age appropriate HM topics. Patient was encouraged to update HM topics. Ordered where agreeable. Reviewed age appropriate anticipatory guidance. Recent labs reviewed include: from June 2022 that I ordered. The 10-year ASCVD risk score (Barbara NOBLE, et al., 2019) is: 3.9%    Values used to calculate the score:      Age: 63 years      Sex: Female      Is Non- : No      Diabetic: No      Tobacco smoker: No      Systolic Blood Pressure: 350 mmHg      Is BP treated: No      HDL Cholesterol: 61 mg/dL      Total Cholesterol: 203 mg/dL    PMH, FH, SxHx, Social Hx reviewed and updated if needed. _________________________________________________________    Assessment / Ma Prachi was seen today for annual exam.    Diagnoses and all orders for this visit:    Encounter for well adult exam without abnormal findings  -     Lipid Panel; Future  -     CBC; Future  -     Basic Metabolic Panel; Future    Encounter for mammogram to establish baseline mammogram  -     VALERY HODA DIGITAL SCREEN BILATERAL; Future    Elevated LDL cholesterol level  -     Lipid Panel; Future  -     CBC; Future  -     Basic Metabolic Panel; Future    Overall Tacho Mcpherson is doing well. Age appropriate HM topics reviewed and updated where agreeable. Vaccines reviewed and updated where agreeable. Age appropriate anticipatory guidance discussed. Encouraged to work on [employer] and exercise strategies. Opportunity to ask questions and answers provided as able. Counseled to check BP 2-3 times a month. Looking for target regularly below 140/90. If above, lets follow up and discuss high blood pressures. Improved significantly on recheck today but still borderline. Mindful eating has helped with weight loss. Probably due for colonoscopy, she'll call Dr. Arroyo. Due for mammogram, hesitant, wants to do Cscope first. Given order. Recommend RTO in 1 year for annual physical.       1 year or as scheduled. Patient counseled to follow up sooner or seek more acute care if symptoms worsening or not improving according to plan. Electronically signed by Amanda Lucero MD on 2/27/2023    _________________________________________________________  No current outpatient medications on file prior to visit. No current facility-administered medications on file prior to visit. Patient Active Problem List   Diagnosis Code    Newly recognized heart murmur R01.1    Breast nodule N63.0     _________________________________________________________  No past medical history on file.     Family History   Problem Relation Age of Onset    Heart Disease Mother 58    Cancer Father 79        Colon    Diabetes Father        Past Surgical History:   Procedure Laterality Date    HYSTERECTOMY (CERVIX STATUS UNKNOWN)      MANDIBLE FRACTURE SURGERY      VARICOSE VEIN SURGERY         Social History     Tobacco Use    Smoking status: Never    Smokeless tobacco: Never   Substance Use Topics    Alcohol use: No    Drug use: No       Chart reviewed and updated where appropriate for PMH, Fam, and Soc Hx.  _________________________________________________________  ROS: POSITIVE: As in the HPI. Otherwise Pertinent negatives are negative.    __________________________________________________________  Physical Exam   Constitutional:    She is oriented to person, place, and time. She appears well-developed and well-nourished. HENT:    Right Ear: normal Pinna, normal canal, normal TM. Left Ear: normal Pinna, normal canal, normal TM. Nose: Nose normal.    Mouth/Throat: Oropharynx is clear and moist.   Eyes:    Conjunctivae are normal.    Pupils are equal, round, and reactive to light. EOMI. Neck:    Normal range of motion. No thyromegaly or nodules noted. No bruit. No LAD. Cardiovascular:    Normal rate, regular rhythm and normal heart sounds. 2/6 RUSB murmur, chronic, Asx. No gallop and no friction rub. Pulmonary/Chest:    Effort normal and breath sounds normal.    No wheezes. No rales or rhonchi. Musculoskeletal:    Normal range of motion. No joint swelling noted. No peripheral edema. Psychiatric:    She has a normal mood and affect. Normal groom and dress. No SI or HI.   ________________________________________________________    This note may have been created using dictation software.  Efforts were made to reduce errors, but some may persist.

## 2023-10-09 ENCOUNTER — TELEPHONE (OUTPATIENT)
Dept: FAMILY MEDICINE CLINIC | Age: 63
End: 2023-10-09

## 2023-10-09 ENCOUNTER — OFFICE VISIT (OUTPATIENT)
Dept: FAMILY MEDICINE CLINIC | Age: 63
End: 2023-10-09
Payer: COMMERCIAL

## 2023-10-09 VITALS
HEIGHT: 72 IN | RESPIRATION RATE: 18 BRPM | WEIGHT: 258 LBS | OXYGEN SATURATION: 97 % | HEART RATE: 80 BPM | TEMPERATURE: 97.9 F | SYSTOLIC BLOOD PRESSURE: 144 MMHG | BODY MASS INDEX: 34.95 KG/M2 | DIASTOLIC BLOOD PRESSURE: 88 MMHG

## 2023-10-09 DIAGNOSIS — R60.0 EDEMA OF RIGHT LOWER EXTREMITY: Primary | ICD-10-CM

## 2023-10-09 DIAGNOSIS — R60.0 EDEMA OF RIGHT LOWER EXTREMITY: ICD-10-CM

## 2023-10-09 LAB
ABSOLUTE IMMATURE GRANULOCYTE: 0.03 K/UL (ref 0–0.58)
ANION GAP SERPL CALCULATED.3IONS-SCNC: 8 MMOL/L (ref 7–16)
BASOPHILS ABSOLUTE: 0.06 K/UL (ref 0–0.2)
BASOPHILS RELATIVE PERCENT: 1 % (ref 0–2)
BUN BLDV-MCNC: 11 MG/DL (ref 6–23)
CALCIUM SERPL-MCNC: 9.8 MG/DL (ref 8.6–10.2)
CHLORIDE BLD-SCNC: 104 MMOL/L (ref 98–107)
CO2: 29 MMOL/L (ref 22–29)
CREAT SERPL-MCNC: 0.8 MG/DL (ref 0.5–1)
D DIMER: 576 NG/ML DDU (ref 0–232)
EOSINOPHILS ABSOLUTE: 0.25 K/UL (ref 0.05–0.5)
EOSINOPHILS RELATIVE PERCENT: 4 % (ref 0–6)
GFR SERPL CREATININE-BSD FRML MDRD: >60 ML/MIN/1.73M2
GLUCOSE BLD-MCNC: 85 MG/DL (ref 74–99)
HCT VFR BLD CALC: 45.2 % (ref 34–48)
HEMOGLOBIN: 14.5 G/DL (ref 11.5–15.5)
IMMATURE GRANULOCYTES: 1 % (ref 0–5)
INR BLD: 1.1
LYMPHOCYTES ABSOLUTE: 1.72 K/UL (ref 1.5–4)
LYMPHOCYTES RELATIVE PERCENT: 27 % (ref 20–42)
MCH RBC QN AUTO: 27.8 PG (ref 26–35)
MCHC RBC AUTO-ENTMCNC: 32.1 G/DL (ref 32–34.5)
MCV RBC AUTO: 86.8 FL (ref 80–99.9)
MONOCYTES ABSOLUTE: 0.68 K/UL (ref 0.1–0.95)
MONOCYTES RELATIVE PERCENT: 11 % (ref 2–12)
NEUTROPHILS ABSOLUTE: 3.64 K/UL (ref 1.8–7.3)
NEUTROPHILS RELATIVE PERCENT: 57 % (ref 43–80)
PDW BLD-RTO: 13.2 % (ref 11.5–15)
PLATELET # BLD: 253 K/UL (ref 130–450)
PMV BLD AUTO: 10.7 FL (ref 7–12)
POTASSIUM SERPL-SCNC: 5.2 MMOL/L (ref 3.5–5)
PROTHROMBIN TIME: 11.4 SEC (ref 9.3–12.4)
RBC # BLD: 5.21 M/UL (ref 3.5–5.5)
SODIUM BLD-SCNC: 141 MMOL/L (ref 132–146)
WBC # BLD: 6.4 K/UL (ref 4.5–11.5)

## 2023-10-09 PROCEDURE — G8484 FLU IMMUNIZE NO ADMIN: HCPCS | Performed by: STUDENT IN AN ORGANIZED HEALTH CARE EDUCATION/TRAINING PROGRAM

## 2023-10-09 PROCEDURE — G8417 CALC BMI ABV UP PARAM F/U: HCPCS | Performed by: STUDENT IN AN ORGANIZED HEALTH CARE EDUCATION/TRAINING PROGRAM

## 2023-10-09 PROCEDURE — 3017F COLORECTAL CA SCREEN DOC REV: CPT | Performed by: STUDENT IN AN ORGANIZED HEALTH CARE EDUCATION/TRAINING PROGRAM

## 2023-10-09 PROCEDURE — 99214 OFFICE O/P EST MOD 30 MIN: CPT | Performed by: STUDENT IN AN ORGANIZED HEALTH CARE EDUCATION/TRAINING PROGRAM

## 2023-10-09 PROCEDURE — 1036F TOBACCO NON-USER: CPT | Performed by: STUDENT IN AN ORGANIZED HEALTH CARE EDUCATION/TRAINING PROGRAM

## 2023-10-09 PROCEDURE — G8427 DOCREV CUR MEDS BY ELIG CLIN: HCPCS | Performed by: STUDENT IN AN ORGANIZED HEALTH CARE EDUCATION/TRAINING PROGRAM

## 2023-10-09 ASSESSMENT — ENCOUNTER SYMPTOMS
SHORTNESS OF BREATH: 0
COUGH: 0
NAUSEA: 0
ABDOMINAL PAIN: 0
VOMITING: 0
RHINORRHEA: 0

## 2023-10-09 NOTE — PROGRESS NOTES
Abigail Chase (:  1960) is a 66 y.o. female,Established patient, here for evaluation of the following chief complaint(s):  Leg Pain (Onset x 7 days /States that she has right thigh pain/edema/discoloration )         ASSESSMENT/PLAN:  1. Edema of right lower extremity  -     US DUP LOWER EXTREMITY RIGHT RIK; Future    Suspect blood clot. Presented options including going straight to ER. Or having clot ruled out in office with u/s. But discussed if +ve she would need a CT to ensure there was no clot in the lungs in which case I would advise ER. She opted to have u/s done in office, if not able to get done this AM she will go to the ER. No follow-ups on file. Subjective   SUBJECTIVE/OBJECTIVE:  R inner thigh pain, going on about a week  Started with knee pain which has resolved  Feels like it is a pulling sensation  It is red  No injury she is aware of  Feels like there are lumps  No hx of clot, no recent travel or inactivity, non smoker        Review of Systems   Constitutional:  Negative for chills and fever. HENT:  Negative for congestion and rhinorrhea. Respiratory:  Negative for cough and shortness of breath. Cardiovascular:  Positive for leg swelling. Negative for chest pain. Gastrointestinal:  Negative for abdominal pain, nausea and vomiting. Genitourinary:  Negative for dysuria and hematuria. Musculoskeletal:  Positive for myalgias. Negative for arthralgias. Skin:  Negative for rash and wound. Neurological:  Negative for dizziness and light-headedness. Objective   Physical Exam  Vitals reviewed. Constitutional:       General: She is not in acute distress. HENT:      Head: Normocephalic and atraumatic. Eyes:      Extraocular Movements: Extraocular movements intact. Conjunctiva/sclera: Conjunctivae normal.   Cardiovascular:      Rate and Rhythm: Normal rate and regular rhythm. Pulmonary:      Effort: Pulmonary effort is normal. No respiratory distress.

## 2023-10-09 NOTE — TELEPHONE ENCOUNTER
Patient called, states that she saw you this AM and had a blood clot. You were going to call her w/ instructions but she did not get a call. She is wanting to know if a blood thinner is needed and if you can call in something for pain.      [facility] in [place]

## 2023-10-09 NOTE — TELEPHONE ENCOUNTER
Not necessary to start blood thinners at this time. She does need quick follow up with PCP. Keep leg elevated. Start taking ibuprofen (motrin or advil) 600 mg three times a day for the rest of the week. If she has an ace bandage she can use that for compression over the area too. Can use tylnol on top of motrin if not improving.

## 2023-10-13 NOTE — TELEPHONE ENCOUNTER
I have only got 1 appointment on Friday at the end of the morning. I have 2 appointments open right now on Tuesday if she would be able to attend then. If not she can have that Friday morning appointment.

## 2023-10-13 NOTE — TELEPHONE ENCOUNTER
Patient called to schedule follow up, she has a colonoscopy on Monday and another appointment on Wednesday. She would like to know if you could see her Friday. Okay to use your ED spot?

## 2023-10-20 ENCOUNTER — OFFICE VISIT (OUTPATIENT)
Dept: FAMILY MEDICINE CLINIC | Age: 63
End: 2023-10-20
Payer: COMMERCIAL

## 2023-10-20 VITALS
WEIGHT: 253.2 LBS | HEART RATE: 65 BPM | TEMPERATURE: 98.6 F | RESPIRATION RATE: 18 BRPM | DIASTOLIC BLOOD PRESSURE: 70 MMHG | SYSTOLIC BLOOD PRESSURE: 150 MMHG | BODY MASS INDEX: 34.29 KG/M2 | OXYGEN SATURATION: 97 % | HEIGHT: 72 IN

## 2023-10-20 DIAGNOSIS — I80.01 SUPERFICIAL PHLEBITIS AND THROMBOPHLEBITIS OF RIGHT LOWER EXTREMITY: Primary | ICD-10-CM

## 2023-10-20 DIAGNOSIS — I83.813 VARICOSE VEINS OF BILATERAL LOWER EXTREMITIES WITH PAIN: ICD-10-CM

## 2023-10-20 PROCEDURE — 99213 OFFICE O/P EST LOW 20 MIN: CPT | Performed by: FAMILY MEDICINE

## 2023-10-20 PROCEDURE — G8417 CALC BMI ABV UP PARAM F/U: HCPCS | Performed by: FAMILY MEDICINE

## 2023-10-20 PROCEDURE — G8427 DOCREV CUR MEDS BY ELIG CLIN: HCPCS | Performed by: FAMILY MEDICINE

## 2023-10-20 PROCEDURE — G8484 FLU IMMUNIZE NO ADMIN: HCPCS | Performed by: FAMILY MEDICINE

## 2023-10-20 PROCEDURE — 3017F COLORECTAL CA SCREEN DOC REV: CPT | Performed by: FAMILY MEDICINE

## 2023-10-20 PROCEDURE — 1036F TOBACCO NON-USER: CPT | Performed by: FAMILY MEDICINE

## 2023-10-20 SDOH — ECONOMIC STABILITY: FOOD INSECURITY: WITHIN THE PAST 12 MONTHS, YOU WORRIED THAT YOUR FOOD WOULD RUN OUT BEFORE YOU GOT MONEY TO BUY MORE.: PATIENT DECLINED

## 2023-10-20 SDOH — ECONOMIC STABILITY: INCOME INSECURITY: HOW HARD IS IT FOR YOU TO PAY FOR THE VERY BASICS LIKE FOOD, HOUSING, MEDICAL CARE, AND HEATING?: PATIENT DECLINED

## 2023-10-20 SDOH — ECONOMIC STABILITY: FOOD INSECURITY: WITHIN THE PAST 12 MONTHS, THE FOOD YOU BOUGHT JUST DIDN'T LAST AND YOU DIDN'T HAVE MONEY TO GET MORE.: PATIENT DECLINED

## 2023-10-20 SDOH — ECONOMIC STABILITY: HOUSING INSECURITY
IN THE LAST 12 MONTHS, WAS THERE A TIME WHEN YOU DID NOT HAVE A STEADY PLACE TO SLEEP OR SLEPT IN A SHELTER (INCLUDING NOW)?: PATIENT REFUSED

## 2023-10-20 NOTE — PROGRESS NOTES
facility-administered medications on file prior to visit. Patient Active Problem List   Diagnosis Code    Newly recognized heart murmur R01.1    Breast nodule N63.0     _________________________________________________________  No past medical history on file. Family History   Problem Relation Age of Onset    Heart Disease Mother 58    Cancer Father 79        Colon    Diabetes Father        Past Surgical History:   Procedure Laterality Date    HYSTERECTOMY (CERVIX STATUS UNKNOWN)      MANDIBLE FRACTURE SURGERY      VARICOSE VEIN SURGERY         Social History     Tobacco Use    Smoking status: Never    Smokeless tobacco: Never   Substance Use Topics    Alcohol use: No    Drug use: No       Chart reviewed and updated where appropriate for PMH, Fam, and Soc Hx.  _________________________________________________________  ROS: POSITIVE: As in the HPI. Otherwise Pertinent negatives are negative.    __________________________________________________________  Physical Exam   Constitutional:    She is oriented to person, place, and time. She appears well-developed and well-nourished. HENT:    Right Ear: normal Pinna, normal canal, normal TM. Left Ear: normal Pinna, normal canal, normal TM. Nose: Nose normal.    Mouth/Throat: Oropharynx is clear and moist.   Eyes:    Conjunctivae are normal.    Pupils are equal, round, and reactive to light. EOMI. Neck:    Normal range of motion. No thyromegaly or nodules noted. No bruit. No LAD. Cardiovascular:    Normal rate, regular rhythm and normal heart sounds. No murmur. No gallop and no friction rub. Pulmonary/Chest:    Effort normal and breath sounds normal.    No wheezes. No rales or rhonchi. Abdominal:    Soft. Bowel sounds are normal.    No distension. No tenderness. Musculoskeletal:    Normal range of motion. No joint swelling noted.     Varicose veins on lower extremities  Area of swelling along inside of thigh inferiorly past the

## 2023-12-08 ENCOUNTER — OFFICE VISIT (OUTPATIENT)
Dept: FAMILY MEDICINE CLINIC | Age: 63
End: 2023-12-08
Payer: COMMERCIAL

## 2023-12-08 VITALS
BODY MASS INDEX: 33.92 KG/M2 | DIASTOLIC BLOOD PRESSURE: 82 MMHG | SYSTOLIC BLOOD PRESSURE: 126 MMHG | RESPIRATION RATE: 18 BRPM | TEMPERATURE: 99.5 F | HEART RATE: 65 BPM | HEIGHT: 72 IN | OXYGEN SATURATION: 97 % | WEIGHT: 250.4 LBS

## 2023-12-08 DIAGNOSIS — R52 BODY ACHES: Primary | ICD-10-CM

## 2023-12-08 DIAGNOSIS — U07.1 COVID-19: ICD-10-CM

## 2023-12-08 LAB
INFLUENZA A ANTIGEN, POC: NEGATIVE
INFLUENZA B ANTIGEN, POC: NEGATIVE
Lab: ABNORMAL
PERFORMING INSTRUMENT: ABNORMAL
QC PASS/FAIL: ABNORMAL
SARS-COV-2, POC: DETECTED

## 2023-12-08 PROCEDURE — 3017F COLORECTAL CA SCREEN DOC REV: CPT | Performed by: FAMILY MEDICINE

## 2023-12-08 PROCEDURE — G8427 DOCREV CUR MEDS BY ELIG CLIN: HCPCS | Performed by: FAMILY MEDICINE

## 2023-12-08 PROCEDURE — 87804 INFLUENZA ASSAY W/OPTIC: CPT | Performed by: FAMILY MEDICINE

## 2023-12-08 PROCEDURE — G8417 CALC BMI ABV UP PARAM F/U: HCPCS | Performed by: FAMILY MEDICINE

## 2023-12-08 PROCEDURE — G8484 FLU IMMUNIZE NO ADMIN: HCPCS | Performed by: FAMILY MEDICINE

## 2023-12-08 PROCEDURE — 1036F TOBACCO NON-USER: CPT | Performed by: FAMILY MEDICINE

## 2023-12-08 PROCEDURE — 87426 SARSCOV CORONAVIRUS AG IA: CPT | Performed by: FAMILY MEDICINE

## 2023-12-08 PROCEDURE — 99213 OFFICE O/P EST LOW 20 MIN: CPT | Performed by: FAMILY MEDICINE

## 2023-12-08 ASSESSMENT — ENCOUNTER SYMPTOMS
EYE DISCHARGE: 0
DIARRHEA: 0
EYE REDNESS: 0
BACK PAIN: 0
VOMITING: 0
ABDOMINAL PAIN: 0
BLOOD IN STOOL: 0
SORE THROAT: 0
ALLERGIC/IMMUNOLOGIC NEGATIVE: 1
CHEST TIGHTNESS: 0
SINUS PAIN: 0
EYE PAIN: 0
TROUBLE SWALLOWING: 0
PHOTOPHOBIA: 0
SHORTNESS OF BREATH: 0
SINUS PRESSURE: 1
NAUSEA: 0
COUGH: 1

## 2023-12-08 NOTE — PROGRESS NOTES
23  Maine Art : 1960 Sex: female  Age: 66 y.o. Assessment and Plan:  Genoveva Plunkett was seen today for generalized body aches, congestion, fever and headache. Diagnoses and all orders for this visit:    Body aches  -     POCT COVID-19, Antigen  -     POCT Influenza A/B Antigen    COVID-19    Rapid antigen test for influenza negative, COVID was positive. She will be off for total of 5 days which means early she can return to work would be next Tuesday or Wednesday. Will treat symptomatically with Tylenol, fluids, rest, Mucinex, Claritin, coolmist vaporizer. If complaints do not improve, or worsen in any way, present back to the office. Return 3 to 5-day recheck if not improved. Chief Complaint   Patient presents with    Generalized Body Aches     Onset x 2 days     Congestion    Fever     Fever yesterday     Headache       Congestion, pressure, drainage, facial tenderness, mild headache, fever, myalgias, onset 2 days ago. Coworker has COVID. Denies fever, chills, diaphoresis, nausea, vomiting, decreased oral intake. Denies other GI or  complaints. OTC treatments minimally effective. Review of Systems   Constitutional:  Positive for fatigue and fever. Negative for appetite change and unexpected weight change. HENT:  Positive for congestion, postnasal drip and sinus pressure. Negative for ear pain, hearing loss, sinus pain, sore throat and trouble swallowing. Eyes:  Negative for photophobia, pain, discharge and redness. Respiratory:  Positive for cough. Negative for chest tightness and shortness of breath. Cardiovascular:  Negative for chest pain, palpitations and leg swelling. Gastrointestinal:  Negative for abdominal pain, blood in stool, diarrhea, nausea and vomiting. Endocrine: Negative. Genitourinary:  Negative for dysuria, flank pain, frequency and hematuria. Musculoskeletal:  Positive for myalgias.  Negative for arthralgias, back pain and joint

## 2023-12-26 ENCOUNTER — OFFICE VISIT (OUTPATIENT)
Dept: FAMILY MEDICINE CLINIC | Age: 63
End: 2023-12-26
Payer: COMMERCIAL

## 2023-12-26 VITALS
WEIGHT: 250 LBS | HEART RATE: 66 BPM | HEIGHT: 72 IN | TEMPERATURE: 98.4 F | OXYGEN SATURATION: 99 % | DIASTOLIC BLOOD PRESSURE: 82 MMHG | SYSTOLIC BLOOD PRESSURE: 138 MMHG | RESPIRATION RATE: 16 BRPM | BODY MASS INDEX: 33.86 KG/M2

## 2023-12-26 DIAGNOSIS — H66.003 NON-RECURRENT ACUTE SUPPURATIVE OTITIS MEDIA OF BOTH EARS WITHOUT SPONTANEOUS RUPTURE OF TYMPANIC MEMBRANES: ICD-10-CM

## 2023-12-26 DIAGNOSIS — R05.1 ACUTE COUGH: Primary | ICD-10-CM

## 2023-12-26 DIAGNOSIS — J04.0 LARYNGITIS: ICD-10-CM

## 2023-12-26 PROCEDURE — 99213 OFFICE O/P EST LOW 20 MIN: CPT | Performed by: FAMILY MEDICINE

## 2023-12-26 PROCEDURE — G8427 DOCREV CUR MEDS BY ELIG CLIN: HCPCS | Performed by: FAMILY MEDICINE

## 2023-12-26 PROCEDURE — 3017F COLORECTAL CA SCREEN DOC REV: CPT | Performed by: FAMILY MEDICINE

## 2023-12-26 PROCEDURE — G8484 FLU IMMUNIZE NO ADMIN: HCPCS | Performed by: FAMILY MEDICINE

## 2023-12-26 PROCEDURE — G8417 CALC BMI ABV UP PARAM F/U: HCPCS | Performed by: FAMILY MEDICINE

## 2023-12-26 PROCEDURE — 1036F TOBACCO NON-USER: CPT | Performed by: FAMILY MEDICINE

## 2023-12-26 RX ORDER — AMOXICILLIN AND CLAVULANATE POTASSIUM 875; 125 MG/1; MG/1
1 TABLET, FILM COATED ORAL 2 TIMES DAILY
Qty: 14 TABLET | Refills: 0 | Status: SHIPPED | OUTPATIENT
Start: 2023-12-26 | End: 2024-01-02

## 2023-12-26 RX ORDER — METHYLPREDNISOLONE 4 MG/1
TABLET ORAL
Qty: 1 KIT | Refills: 0 | Status: SHIPPED | OUTPATIENT
Start: 2023-12-26 | End: 2024-01-01

## 2023-12-26 NOTE — PROGRESS NOTES
DOSEPACK) 4 MG tablet; Take by mouth.     Non-recurrent acute suppurative otitis media of both ears without spontaneous rupture of tympanic membranes         Martina Cook MD

## 2024-02-15 ENCOUNTER — TELEPHONE (OUTPATIENT)
Dept: FAMILY MEDICINE CLINIC | Age: 64
End: 2024-02-15

## 2024-02-15 ENCOUNTER — OFFICE VISIT (OUTPATIENT)
Dept: FAMILY MEDICINE CLINIC | Age: 64
End: 2024-02-15
Payer: COMMERCIAL

## 2024-02-15 VITALS
RESPIRATION RATE: 20 BRPM | WEIGHT: 252 LBS | SYSTOLIC BLOOD PRESSURE: 132 MMHG | OXYGEN SATURATION: 97 % | BODY MASS INDEX: 34.13 KG/M2 | HEIGHT: 72 IN | HEART RATE: 78 BPM | DIASTOLIC BLOOD PRESSURE: 74 MMHG | TEMPERATURE: 97.8 F

## 2024-02-15 DIAGNOSIS — L03.012 CELLULITIS OF LEFT RING FINGER: ICD-10-CM

## 2024-02-15 DIAGNOSIS — M86.142 OTHER ACUTE OSTEOMYELITIS OF LEFT HAND (HCC): Primary | ICD-10-CM
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
## 2024-02-15 DIAGNOSIS — S61.255A: ICD-10-CM

## 2024-02-15 PROCEDURE — G8417 CALC BMI ABV UP PARAM F/U: HCPCS | Performed by: EMERGENCY MEDICINE

## 2024-02-15 PROCEDURE — 99214 OFFICE O/P EST MOD 30 MIN: CPT | Performed by: EMERGENCY MEDICINE

## 2024-02-15 PROCEDURE — 1036F TOBACCO NON-USER: CPT | Performed by: EMERGENCY MEDICINE

## 2024-02-15 PROCEDURE — 3017F COLORECTAL CA SCREEN DOC REV: CPT | Performed by: EMERGENCY MEDICINE

## 2024-02-15 PROCEDURE — G8427 DOCREV CUR MEDS BY ELIG CLIN: HCPCS | Performed by: EMERGENCY MEDICINE

## 2024-02-15 PROCEDURE — G8484 FLU IMMUNIZE NO ADMIN: HCPCS | Performed by: EMERGENCY MEDICINE

## 2024-02-15 RX ORDER — AMOXICILLIN AND CLAVULANATE POTASSIUM 875; 125 MG/1; MG/1
1 TABLET, FILM COATED ORAL 2 TIMES DAILY
Qty: 20 TABLET | Refills: 0 | Status: SHIPPED | OUTPATIENT
Start: 2024-02-15 | End: 2024-02-25

## 2024-02-15 NOTE — PROGRESS NOTES
Red flag symptoms were also discussed with the patient today. If symptoms worsen the patient is to go directly to the emergency department for reevaluation and treatment. Pt verbalizes understanding and is in agreement with plan of care. All questions answered.    LATE ENTRY: Xray official report reveals possible early osteomyelitis of L index finger secondary to cat bite  Pt notification was by voicemail on home and cell phone.  PCP notified here in the office.  SHEBA Linares urgent ORTHO CONSULT PLACED NOW>    New Medications     New Prescriptions    AMOXICILLIN-CLAVULANATE (AUGMENTIN) 875-125 MG PER TABLET    Take 1 tablet by mouth 2 times daily for 10 days       Electronically signed by Robert Batista DO   DD: 2/15/24

## 2024-02-15 NOTE — TELEPHONE ENCOUNTER
Per Dr. Batista  He called pt stating that SHEBA Ny will be in contact with Ortho due to a possible infection to the bone.

## 2024-02-16 ENCOUNTER — TELEPHONE (OUTPATIENT)
Dept: FAMILY MEDICINE CLINIC | Age: 64
End: 2024-02-16

## 2024-02-16 ENCOUNTER — TELEPHONE (OUTPATIENT)
Dept: ORTHOPEDIC SURGERY | Age: 64
End: 2024-02-16

## 2024-02-16 NOTE — TELEPHONE ENCOUNTER
Received message from Filomena Fuentes MA,PTA with [facility] that patient cancelled her appointment with the ortho trauma office.  LVM for patient requesting return phone call to [facility] at [phone] to discuss appointment for her hand.

## 2024-02-16 NOTE — TELEPHONE ENCOUNTER
office's phone number, and advised that she call them to cancel her appointment if she didn't think she would make it. The pt agreed with this plan.

## 2024-02-16 NOTE — TELEPHONE ENCOUNTER
Was able to contact patient on her mobile number.  Patient stated she was at home waiting for a call from the social security office.  Stated she had started taking the antibiotics prescribed.  She again declined to go to any appointments offered to her today, or go to the ED at [facility] or [facility] despite the medical recommendations of Dr. Batista and others.  Educated patient on the reason for urgent appointment including consequences of osteomyelitis of her finger including loss of digit or systemic infection, and need to get her ring removed.  She verbalized understanding but said she would not be able to go today. Finalized call with patient that the medical recommendation is that she is to be seen within 24 hours.  She verbalized she understood. Ortho physician on call at [facility] was notified. Dr. Batista was informed.

## 2024-02-16 NOTE — TELEPHONE ENCOUNTER
Was contacted by [facility] office regarding patient that showed at their office that was an urgent referral to [facility].  Was able to get appointment with Ortho Trauma office.

## 2024-02-16 NOTE — TELEPHONE ENCOUNTER
I called patient's home phone.  Went to answering machine.  I called patient's cell phone.  I got her voicemail.  I left a message.  Asked how she was doing, told her the information that I read she received so far is appropriate and I do not think that anyone is overreacting.  Advised that if this is not improving through the weekend or worsening anytime sooner she really needs to go to the emergency room so she can be evaluated by orthopedic surgery.

## 2024-02-19 ENCOUNTER — TELEPHONE (OUTPATIENT)
Dept: FAMILY MEDICINE CLINIC | Age: 64
End: 2024-02-19

## 2024-02-19 NOTE — TELEPHONE ENCOUNTER
She going to see Dr Jin Barbosa, he is an ortho surgeon, regarding cat bite    Wednesday 02.21.24 is her appt

## 2024-02-21 ENCOUNTER — PREP FOR PROCEDURE (OUTPATIENT)
Dept: ORTHOPEDIC SURGERY | Age: 64
End: 2024-02-21

## 2024-02-21 ENCOUNTER — OFFICE VISIT (OUTPATIENT)
Dept: ORTHOPEDIC SURGERY | Age: 64
End: 2024-02-21
Payer: COMMERCIAL

## 2024-02-21 ENCOUNTER — TELEPHONE (OUTPATIENT)
Dept: ORTHOPEDIC SURGERY | Age: 64
End: 2024-02-21

## 2024-02-21 ENCOUNTER — HOSPITAL ENCOUNTER (OUTPATIENT)
Dept: GENERAL RADIOLOGY | Age: 64
Discharge: HOME | End: 2024-02-23
Payer: COMMERCIAL

## 2024-02-21 ENCOUNTER — ANESTHESIA (OUTPATIENT)
Dept: OPERATING ROOM | Age: 64
End: 2024-02-21
Payer: COMMERCIAL

## 2024-02-21 DIAGNOSIS — S61.255A: ICD-10-CM
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
## 2024-02-21 DIAGNOSIS — R52 PAIN: ICD-10-CM

## 2024-02-21 DIAGNOSIS — R52 PAIN: Primary | ICD-10-CM

## 2024-02-21 DIAGNOSIS — L02.512 ABSCESS OF FINGER OF LEFT HAND: Primary | ICD-10-CM

## 2024-02-21 DIAGNOSIS — L02.512 ABSCESS OF LEFT RING FINGER: ICD-10-CM

## 2024-02-21 PROCEDURE — G8484 FLU IMMUNIZE NO ADMIN: HCPCS | Performed by: ORTHOPAEDIC SURGERY

## 2024-02-21 PROCEDURE — 99204 OFFICE O/P NEW MOD 45 MIN: CPT | Performed by: ORTHOPAEDIC SURGERY

## 2024-02-21 PROCEDURE — 3017F COLORECTAL CA SCREEN DOC REV: CPT | Performed by: ORTHOPAEDIC SURGERY

## 2024-02-21 PROCEDURE — 1036F TOBACCO NON-USER: CPT | Performed by: ORTHOPAEDIC SURGERY

## 2024-02-21 PROCEDURE — G8427 DOCREV CUR MEDS BY ELIG CLIN: HCPCS | Performed by: ORTHOPAEDIC SURGERY

## 2024-02-21 PROCEDURE — 73140 X-RAY EXAM OF FINGER(S): CPT

## 2024-02-21 PROCEDURE — G8417 CALC BMI ABV UP PARAM F/U: HCPCS | Performed by: ORTHOPAEDIC SURGERY

## 2024-02-21 NOTE — PATIENT INSTRUCTIONS
Procedure: Left ring finger irrigation & debridement    You will not need medical clearance prior to surgery.     Your surgery is scheduled for 2/22/2024 at 1:30 PM  with Dr. Jin Barbosa MD at the main [place] [facility] in [place] .  You will need to report to Preop area  that morning at 11:30 AM.   All surgery start times are subject to change. You will be notified by office and/or PAT department if your surgery time changes. If you need to cancel/reschedule your surgery for any reason, please contact Lizz at [phone], ext #[phone] ASAP.   You are having Outpatient surgery, but plan for 1-2 nights in the hospital for recovery if needed.  Preadmission Testing (PAT) department at [facility] will contact you with further details regarding pre-operative blood work, where to park and enter the hospital, your medication list, etc. If you have any surgery related questions please contact PAT at [facility] at [phone].  Nothing to eat or drink after midnight the night before surgery.  You may take a pain pill and any other medicine PAT instructs you to take with small sip of water if needed.  Continue with ice and elevation to reduce swelling  If you are taking Aspirin or Lovenox, hold the day of surgery. If taking Eliquis, hold this 48 hours prior to surgery. Hold all NSAIDs (non-steroidal anti-inflammatories like Advil, motrin, ibuprofen, etc) 7 days prior to surgery.    Call office with any question or concerns: [phone]    All surgical patients will be gradually tapered off narcotic pain medication post operatively, this office will not continue narcotics longer than 6 weeks from date of surgery. If narcotics are required longer than this time period you will be referred to pain management.    Your first post-operative office visit is usually 2-3 weeks after surgery with the Physician Assistant. Your staples or stitches will be removed in the office at that time.    2 week post

## 2024-02-21 NOTE — H&P (VIEW-ONLY)
Chief Complaint   Patient presents with    New Patient     New pt. Here for cat bite to left ring finger. DOI 2/4/24. Left ring finger edematous and purple in color. Was open and draining. Stopped draining a few days ago.       SUBJECTIVE: Patient is a very pleasant 63-year-old female who comes in today with left ring finger infection due to a cat bite.  She was originally bit on the finger on 2/4/2024.  She was seen in the ER on 2/15/2024 she had hesitation on following up.  She was then set up to follow-up with me today in the office.  The finger remains red and swollen.  She has a significant abscess but no issue moving the digit suggesting septic joint or flexor tenosynovitis.  She is currently taking oral antibiotics.  I try to send her to the emergency department but due to a massive amount of patients she would not be seen for hours therefore she elected to go on my surgery schedule tomorrow for irrigation debridement with admission to determine her appropriate cultures antibiotic treatment.  She does not have any changes currently consistent with osteomyelitis although I did explain to her it looks like she may have had a small nondisplaced fracture in her original x-rays from the emergency department I do not see that today.  I did explain there still is a risk that she could develop osteomyelitis to the long nature of this infection right now of approximately a month.  She understands.  I said the best treatment is to get in there and irrigate and debride it and get her on appropriate antibiotics.  She understands this.  With the risk of surgery in detail including death and anesthesia, and further infection, development of osteomyelitis requiring amputation of the finger, the fact that her ring will need to be cut and removed due to the fact that is getting tight on her finger, neurovascular damage including numbness on that side of the finger which is her radial side where the abscess is, and any other  removal    Follow-up for surgery, call sooner with any questions or concerns    Continue taking oral antibiotics      ELECTRONICALLY signed by:    Jin Barbosa MD  2/21/24    NOTE: This report was transcribed using voice recognition software. Every effort was made to ensure accuracy; however, inadvertent computerized transcription errors may be present

## 2024-02-21 NOTE — PROGRESS NOTES
[facility]   PRE-ADMISSION TESTING GENERAL INSTRUCTIONS  PAT Phone Number: [phone]      GENERAL INSTRUCTIONS:    [x] Antibacterial Soap Shower Night before and/or AM of surgery.  [] CHG Wipes instruction sheet and wipes given.  [x] Do not wear makeup, lotions, powders, deodorant the morning of surgery.  [x] No heavy solid foods after midnight; you may have a light meal (Example: Toast) up to 6 hours prior to surgery.  [x] May have CLEAR liquids up to 2 hours prior to your scheduled surgery.   (Examples: water, fruit juices without pulp, carbonated beverages, clear tea and black coffee).  [x] You may brush your teeth, gargle, but do NOT swallow water.   [x] No tobacco products, illegal drugs, or alcohol within 24 hours of your surgery.  [x] Jewelry or valuables should not be brought to the hospital. All body and/or tongue piercing's must be removed prior to arriving to hospital. No contact lens or hair pins.   [x] Arrange transportation with a responsible adult  to and from the hospital. Arrange for someone to be with you for the remainder of the day and for 24 hours after your procedure due to having had anesthesia.          -Who will be your  for transportation? Shaggy        -Who will be staying with you for 24 hrs after your procedure? Shaggy  [x] Bring insurance card and photo ID.  [] Bring copy of living will or healthcare power of  papers to be placed in your electronic record.  [] Urine Pregnancy test will be preformed the day of surgery. A specimen sample may be brought from home.  [] Transfusion (Green) Bracelet: Please bring with you to hospital, day of surgery.     PARKING INSTRUCTIONS:     [x] ARRIVAL DATE & TIME: 2/22/24 at 1130  [x] Times are subject to change. We will contact you the business day before surgery if that were to occur.  [x] Enter into the [facility] Entrance. Two people may accompany you. Masks are not required.  [x] Parking Lot \"I\"  Representative. Please bring your photo ID and insurance card. A nurse will greet you in accordance to the time you are needed in the pre-op area to prepare you for surgery. Please do not be discouraged if you are not greeted in the order you arrive as there are many variables that are involved in patient preparation. Your patience is greatly appreciated as you wait for your nurse.   [x] Delays may occur with surgery and staff will make a sincere effort to keep you informed of delays. If any delays occur with your procedure, we apologize ahead of time for your inconvenience as we recognize the value of your time.

## 2024-02-21 NOTE — TELEPHONE ENCOUNTER
Prior Authorization Form:    DEMOGRAPHICS:                     Patient Name:  Rasta Joseph  Patient :  1960            Insurance:  Payor: [employer] / Plan: [employer] [address] / Product Type: *No Product type* /   Insurance ID Number:    Payer/Plan Subscr  Sex Relation Sub. Ins. ID Effective Group Num   1. [employer] 3/12/1958 Male Spouse [id number]                                   [address]       [] Prior authorization obtained    [x] No Prior authorization required for CPT Code 26732, 48782. Spoke with [employer] Prior Auth Department, phone # [phone]. Spoke with [employer] [facility] Call Ref # [id number].     [] Prior authorization pending - Case #       DIAGNOSIS & PROCEDURE:                       Procedure/Operation: Irrigation and Debridement of Left Hand Ring Finger Abscess           CPT Code: 62286, 63699    Diagnosis:  Abscess of Left Ring Finger, Open Wound of Left Ring Finger due to Cat Bite    ICD10 Code: L02.512, S61.255A    Location:  [facility]    Surgeon:  Dr. Barbosa     SCHEDULING INFORMATION:                          Date: 2024    Time: 1:30 pm              Anesthesia:  General                                                        Status: Outpatient     Special Comments:         Vendor: N/A  []   Notified     Length of Surgery (with 30 min clean up time): 1 hour    Medical clearance: No Medical Clearance Needed    Pre-Op Labs:       []  Orders Placed    Electronically signed by Lizz Munguia MA on 2024 at 3:34 PM

## 2024-02-21 NOTE — PROGRESS NOTES
Chief Complaint   Patient presents with    New Patient     New pt. Here for cat bite to left ring finger. DOI 2/4/24. Left ring finger edematous and purple in color. Was open and draining. Stopped draining a few days ago.       SUBJECTIVE: Patient is a very pleasant 63-year-old female who comes in today with left ring finger infection due to a cat bite.  She was originally bit on the finger on 2/4/2024.  She was seen in the ER on 2/15/2024 she had hesitation on following up.  She was then set up to follow-up with me today in the office.  The finger remains red and swollen.  She has a significant abscess but no issue moving the digit suggesting septic joint or flexor tenosynovitis.  She is currently taking oral antibiotics.  I try to send her to the emergency department but due to a massive amount of patients she would not be seen for hours therefore she elected to go on my surgery schedule tomorrow for irrigation debridement with admission to determine her appropriate cultures antibiotic treatment.  She does not have any changes currently consistent with osteomyelitis although I did explain to her it looks like she may have had a small nondisplaced fracture in her original x-rays from the emergency department I do not see that today.  I did explain there still is a risk that she could develop osteomyelitis to the long nature of this infection right now of approximately a month.  She understands.  I said the best treatment is to get in there and irrigate and debride it and get her on appropriate antibiotics.  She understands this.  With the risk of surgery in detail including death and anesthesia, and further infection, development of osteomyelitis requiring amputation of the finger, the fact that her ring will need to be cut and removed due to the fact that is getting tight on her finger, neurovascular damage including numbness on that side of the finger which is her radial side where the abscess is, and any other

## 2024-02-22 ENCOUNTER — ANESTHESIA EVENT (OUTPATIENT)
Dept: OPERATING ROOM | Age: 64
End: 2024-02-22
Payer: COMMERCIAL

## 2024-02-22 ENCOUNTER — HOSPITAL ENCOUNTER (INPATIENT)
Age: 64
LOS: 1 days | Discharge: HOME | DRG: 603 | End: 2024-02-23
Attending: ORTHOPAEDIC SURGERY | Admitting: ORTHOPAEDIC SURGERY
Payer: COMMERCIAL

## 2024-02-22 DIAGNOSIS — L02.512 ABSCESS OF LEFT RING FINGER: ICD-10-CM

## 2024-02-22 DIAGNOSIS — G89.18 ACUTE POST-OPERATIVE PAIN: ICD-10-CM
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
## 2024-02-22 DIAGNOSIS — S61.255A: ICD-10-CM

## 2024-02-22 DIAGNOSIS — L03.012 CELLULITIS OF LEFT RING FINGER: ICD-10-CM

## 2024-02-22 DIAGNOSIS — Z01.810 PRE-OPERATIVE CARDIOVASCULAR EXAMINATION: Primary | ICD-10-CM

## 2024-02-22 PROCEDURE — 2580000003 HC RX 258: Performed by: PHYSICIAN ASSISTANT

## 2024-02-22 PROCEDURE — 7100000001 HC PACU RECOVERY - ADDTL 15 MIN: Performed by: ORTHOPAEDIC SURGERY

## 2024-02-22 PROCEDURE — 0X9K0ZZ DRAINAGE OF LEFT HAND, OPEN APPROACH: ICD-10-PCS | Performed by: ORTHOPAEDIC SURGERY

## 2024-02-22 PROCEDURE — 3700000001 HC ADD 15 MINUTES (ANESTHESIA): Performed by: ORTHOPAEDIC SURGERY

## 2024-02-22 PROCEDURE — 6370000000 HC RX 637 (ALT 250 FOR IP)

## 2024-02-22 PROCEDURE — 2500000003 HC RX 250 WO HCPCS: Performed by: ORTHOPAEDIC SURGERY

## 2024-02-22 PROCEDURE — 1200000000 HC SEMI PRIVATE

## 2024-02-22 PROCEDURE — 2580000003 HC RX 258

## 2024-02-22 PROCEDURE — 6360000002 HC RX W HCPCS: Performed by: PHYSICIAN ASSISTANT

## 2024-02-22 PROCEDURE — 7100000000 HC PACU RECOVERY - FIRST 15 MIN: Performed by: ORTHOPAEDIC SURGERY

## 2024-02-22 PROCEDURE — 87070 CULTURE OTHR SPECIMN AEROBIC: CPT

## 2024-02-22 PROCEDURE — 3600000007 HC SURGERY HYBRID BASE: Performed by: ORTHOPAEDIC SURGERY

## 2024-02-22 PROCEDURE — 2709999900 HC NON-CHARGEABLE SUPPLY: Performed by: ORTHOPAEDIC SURGERY

## 2024-02-22 PROCEDURE — 3700000000 HC ANESTHESIA ATTENDED CARE: Performed by: ORTHOPAEDIC SURGERY

## 2024-02-22 PROCEDURE — 87205 SMEAR GRAM STAIN: CPT

## 2024-02-22 PROCEDURE — 6360000002 HC RX W HCPCS: Performed by: NURSE ANESTHETIST, CERTIFIED REGISTERED

## 2024-02-22 PROCEDURE — 3600000017 HC SURGERY HYBRID ADDL 15MIN: Performed by: ORTHOPAEDIC SURGERY

## 2024-02-22 PROCEDURE — 2580000003 HC RX 258: Performed by: NURSE ANESTHETIST, CERTIFIED REGISTERED

## 2024-02-22 PROCEDURE — 87075 CULTR BACTERIA EXCEPT BLOOD: CPT

## 2024-02-22 PROCEDURE — 93005 ELECTROCARDIOGRAM TRACING: CPT

## 2024-02-22 RX ORDER — MORPHINE SULFATE 2 MG/ML
2 INJECTION, SOLUTION INTRAMUSCULAR; INTRAVENOUS
Status: DISCONTINUED | OUTPATIENT
Start: 2024-02-22 | End: 2024-02-23 | Stop reason: HOSPADM

## 2024-02-22 RX ORDER — OXYCODONE HYDROCHLORIDE AND ACETAMINOPHEN 5; 325 MG/1; MG/1
1 TABLET ORAL EVERY 6 HOURS PRN
Qty: 28 TABLET | Refills: 0 | Status: SHIPPED | OUTPATIENT
Start: 2024-02-22 | End: 2024-02-29

## 2024-02-22 RX ORDER — MIDAZOLAM HYDROCHLORIDE 1 MG/ML
INJECTION INTRAMUSCULAR; INTRAVENOUS PRN
Status: DISCONTINUED | OUTPATIENT
Start: 2024-02-22 | End: 2024-02-22 | Stop reason: SDUPTHER

## 2024-02-22 RX ORDER — SODIUM CHLORIDE, SODIUM LACTATE, POTASSIUM CHLORIDE, CALCIUM CHLORIDE 600; 310; 30; 20 MG/100ML; MG/100ML; MG/100ML; MG/100ML
INJECTION, SOLUTION INTRAVENOUS CONTINUOUS PRN
Status: DISCONTINUED | OUTPATIENT
Start: 2024-02-22 | End: 2024-02-22 | Stop reason: SDUPTHER

## 2024-02-22 RX ORDER — ACETAMINOPHEN 325 MG/1
650 TABLET ORAL EVERY 6 HOURS
Status: DISCONTINUED | OUTPATIENT
Start: 2024-02-22 | End: 2024-02-23 | Stop reason: HOSPADM

## 2024-02-22 RX ORDER — SODIUM CHLORIDE 0.9 % (FLUSH) 0.9 %
5-40 SYRINGE (ML) INJECTION EVERY 12 HOURS SCHEDULED
Status: CANCELLED | OUTPATIENT
Start: 2024-02-22

## 2024-02-22 RX ORDER — SODIUM CHLORIDE 0.9 % (FLUSH) 0.9 %
5-40 SYRINGE (ML) INJECTION PRN
Status: DISCONTINUED | OUTPATIENT
Start: 2024-02-22 | End: 2024-02-23 | Stop reason: HOSPADM

## 2024-02-22 RX ORDER — ONDANSETRON 2 MG/ML
4 INJECTION INTRAMUSCULAR; INTRAVENOUS EVERY 6 HOURS PRN
Status: DISCONTINUED | OUTPATIENT
Start: 2024-02-22 | End: 2024-02-23 | Stop reason: HOSPADM

## 2024-02-22 RX ORDER — SODIUM CHLORIDE 0.9 % (FLUSH) 0.9 %
5-40 SYRINGE (ML) INJECTION PRN
Status: CANCELLED | OUTPATIENT
Start: 2024-02-22

## 2024-02-22 RX ORDER — SODIUM CHLORIDE 9 MG/ML
INJECTION, SOLUTION INTRAVENOUS PRN
Status: CANCELLED | OUTPATIENT
Start: 2024-02-22

## 2024-02-22 RX ORDER — ASPIRIN 325 MG
325 TABLET ORAL 2 TIMES DAILY
Status: DISCONTINUED | OUTPATIENT
Start: 2024-02-22 | End: 2024-02-23 | Stop reason: HOSPADM

## 2024-02-22 RX ORDER — OXYCODONE HYDROCHLORIDE 10 MG/1
10 TABLET ORAL EVERY 4 HOURS PRN
Status: DISCONTINUED | OUTPATIENT
Start: 2024-02-22 | End: 2024-02-23 | Stop reason: HOSPADM

## 2024-02-22 RX ORDER — ONDANSETRON 4 MG/1
4 TABLET, ORALLY DISINTEGRATING ORAL EVERY 8 HOURS PRN
Status: DISCONTINUED | OUTPATIENT
Start: 2024-02-22 | End: 2024-02-23 | Stop reason: HOSPADM

## 2024-02-22 RX ORDER — BISACODYL 5 MG/1
5 TABLET, DELAYED RELEASE ORAL DAILY PRN
Status: DISCONTINUED | OUTPATIENT
Start: 2024-02-22 | End: 2024-02-23 | Stop reason: HOSPADM

## 2024-02-22 RX ORDER — OXYCODONE HYDROCHLORIDE 5 MG/1
5 TABLET ORAL EVERY 4 HOURS PRN
Status: DISCONTINUED | OUTPATIENT
Start: 2024-02-22 | End: 2024-02-23 | Stop reason: HOSPADM

## 2024-02-22 RX ORDER — SODIUM CHLORIDE 0.9 % (FLUSH) 0.9 %
5-40 SYRINGE (ML) INJECTION EVERY 12 HOURS SCHEDULED
Status: DISCONTINUED | OUTPATIENT
Start: 2024-02-22 | End: 2024-02-22 | Stop reason: HOSPADM

## 2024-02-22 RX ORDER — METHOCARBAMOL 500 MG/1
500 TABLET, FILM COATED ORAL 4 TIMES DAILY PRN
Status: DISCONTINUED | OUTPATIENT
Start: 2024-02-22 | End: 2024-02-23 | Stop reason: HOSPADM

## 2024-02-22 RX ORDER — SODIUM CHLORIDE 0.9 % (FLUSH) 0.9 %
5-40 SYRINGE (ML) INJECTION EVERY 12 HOURS SCHEDULED
Status: DISCONTINUED | OUTPATIENT
Start: 2024-02-22 | End: 2024-02-23 | Stop reason: HOSPADM

## 2024-02-22 RX ORDER — SODIUM CHLORIDE 9 MG/ML
INJECTION, SOLUTION INTRAVENOUS PRN
Status: DISCONTINUED | OUTPATIENT
Start: 2024-02-22 | End: 2024-02-23 | Stop reason: HOSPADM

## 2024-02-22 RX ORDER — GABAPENTIN 300 MG/1
300 CAPSULE ORAL 3 TIMES DAILY
Status: DISCONTINUED | OUTPATIENT
Start: 2024-02-22 | End: 2024-02-23 | Stop reason: HOSPADM

## 2024-02-22 RX ORDER — SODIUM CHLORIDE 9 MG/ML
INJECTION, SOLUTION INTRAVENOUS PRN
Status: DISCONTINUED | OUTPATIENT
Start: 2024-02-22 | End: 2024-02-22 | Stop reason: HOSPADM

## 2024-02-22 RX ORDER — MORPHINE SULFATE 4 MG/ML
4 INJECTION, SOLUTION INTRAMUSCULAR; INTRAVENOUS
Status: DISCONTINUED | OUTPATIENT
Start: 2024-02-22 | End: 2024-02-23 | Stop reason: HOSPADM

## 2024-02-22 RX ORDER — PROPOFOL 10 MG/ML
INJECTION, EMULSION INTRAVENOUS CONTINUOUS PRN
Status: DISCONTINUED | OUTPATIENT
Start: 2024-02-22 | End: 2024-02-22 | Stop reason: SDUPTHER

## 2024-02-22 RX ORDER — SODIUM CHLORIDE 0.9 % (FLUSH) 0.9 %
5-40 SYRINGE (ML) INJECTION PRN
Status: DISCONTINUED | OUTPATIENT
Start: 2024-02-22 | End: 2024-02-22 | Stop reason: HOSPADM

## 2024-02-22 RX ADMIN — MIDAZOLAM 2 MG: 1 INJECTION INTRAMUSCULAR; INTRAVENOUS at 18:26

## 2024-02-22 RX ADMIN — GABAPENTIN 300 MG: 300 CAPSULE ORAL at 22:11

## 2024-02-22 RX ADMIN — CEFAZOLIN 2000 MG: 2 INJECTION, POWDER, FOR SOLUTION INTRAMUSCULAR; INTRAVENOUS at 18:30

## 2024-02-22 RX ADMIN — SODIUM CHLORIDE 5 ML/HR: 9 INJECTION, SOLUTION INTRAVENOUS at 11:53

## 2024-02-22 RX ADMIN — SODIUM CHLORIDE, PRESERVATIVE FREE 10 ML: 5 INJECTION INTRAVENOUS at 22:12

## 2024-02-22 RX ADMIN — PROPOFOL 75 MCG/KG/MIN: 10 INJECTION, EMULSION INTRAVENOUS at 18:26

## 2024-02-22 RX ADMIN — SODIUM CHLORIDE, POTASSIUM CHLORIDE, SODIUM LACTATE AND CALCIUM CHLORIDE: 600; 310; 30; 20 INJECTION, SOLUTION INTRAVENOUS at 17:40

## 2024-02-22 RX ADMIN — ACETAMINOPHEN 650 MG: 325 TABLET ORAL at 22:10

## 2024-02-22 ASSESSMENT — PAIN DESCRIPTION - DESCRIPTORS: DESCRIPTORS: ACHING

## 2024-02-22 ASSESSMENT — PAIN - FUNCTIONAL ASSESSMENT
PAIN_FUNCTIONAL_ASSESSMENT: 0-10
PAIN_FUNCTIONAL_ASSESSMENT: NONE - DENIES PAIN

## 2024-02-22 ASSESSMENT — PAIN DESCRIPTION - LOCATION: LOCATION: HAND

## 2024-02-22 ASSESSMENT — PAIN SCALES - GENERAL: PAINLEVEL_OUTOF10: 5

## 2024-02-22 ASSESSMENT — PAIN DESCRIPTION - ORIENTATION: ORIENTATION: LEFT

## 2024-02-22 NOTE — PROGRESS NOTES
Patients ring cut off by Dr Barbosa and placed into bag with patient ID label. Bag placed into patient chart and will return with patient to post-op recovery and nurse will be notified at that time.     Electronically signed by Shanna Ritchie RN on 2/22/24 at 6:56 PM EST

## 2024-02-22 NOTE — DISCHARGE INSTRUCTIONS
[facility] Department of Orthopedic Surgery  [address] [facility] [address]    Dr. Robert Sanchez, DO         MD Dr. Robert Metzger MD Frank Ansevin, PA-C Sara Zatchok PA-C Tyler Tsangaris PA-C      Orthopaedics Discharge Instructions   Non-weightbearing on left upper extremity  Pain medication Per Prescriptions  Contact Office for Medication Refill- [phone]  Office can refill pain med every 7 days  If patient discharging to facility then pain control will be continued per facility physician  Ice to operative/injured site for 15-30 minutes of each hour for next 5 days    Recommend that you continue to ice the area 2-3 times per day after this   Elevate operative/injured limb on 2 pillows at home  Goal is to have limb above the heart if able  Wound care -  Patient will require 50-50 hydrogen peroxide and sterile saline soaks. Patient is to soak finger 3-4 times daily for approximately 2 to 5 minutes and then redress wound.      Follow Up in Office in 2 weeks. Your first post op appointment is often with one of our PAs.     Call the office at [phone] or directions or with any questions.  Watch for these significant complications.  Call physician if they or any other problems occur:  Fever over 101°, redness, swelling or warmth at the operative site  Unrelieved nausea    Foul smelling or cloudy drainage at the operative site   Unrelieved pain    Blood soaked dressing. (Some oozing may be normal)     Numb, pale, blue, cold or tingling extremity

## 2024-02-22 NOTE — ANESTHESIA PRE PROCEDURE
Lab Results   Component Value Date/Time    PROTIME 11.4 10/09/2023 09:28 AM    INR 1.1 10/09/2023 09:28 AM       HCG (If Applicable): No results found for: \"PREGTESTUR\", \"PREGSERUM\", \"HCG\", \"HCGQUANT\"     ABGs: No results found for: \"PHART\", \"PO2ART\", \"ETM6EKQ\", \"JTL2PBV\", \"BEART\", \"O4HJIBBA\"     Type & Screen (If Applicable):  No results found for: \"LABABO\", \"LABRH\"    Drug/Infectious Status (If Applicable):  No results found for: \"HIV\", \"HEPCAB\"    COVID-19 Screening (If Applicable):   Lab Results   Component Value Date/Time    COVID19 Detected 12/08/2023 08:24 AM    COVID19 Detected 12/27/2021 12:00 PM           Anesthesia Evaluation     no history of anesthetic complications:   Airway: Mallampati: I  TM distance: >3 FB   Neck ROM: full  Mouth opening: > = 3 FB   Dental: normal exam         Pulmonary:Negative Pulmonary ROS                              Cardiovascular:Negative CV ROS                      Neuro/Psych:   Negative Neuro/Psych ROS              GI/Hepatic/Renal: Neg GI/Hepatic/Renal ROS            Endo/Other: Negative Endo/Other ROS                    Abdominal:             Vascular: negative vascular ROS.         Other Findings:             Anesthesia Plan      MAC     ASA 1       Induction: intravenous.      Anesthetic plan and risks discussed with patient.      Plan discussed with CRNA.                I have reviewed the patient chart and made all necessary adjustments.  I personally interviewed and examined the patient at bedside, findings and the anesthetic management as above.     Kathy Batista MD   2/22/2024

## 2024-02-22 NOTE — INTERVAL H&P NOTE
Update History & Physical    The patient's History and Physical of February 21, 2024 was reviewed with the patient and I examined the patient. There was no change. The surgical site was confirmed by the patient and me.     Plan: The risks, benefits, expected outcome, and alternative to the recommended procedure have been discussed with the patient. Patient understands and wants to proceed with the procedure.     Electronically signed by Jin Barbosa MD on 2/22/2024 at 11:53 AM

## 2024-02-23 VITALS
BODY MASS INDEX: 34.13 KG/M2 | TEMPERATURE: 98.2 F | HEART RATE: 73 BPM | DIASTOLIC BLOOD PRESSURE: 91 MMHG | SYSTOLIC BLOOD PRESSURE: 153 MMHG | OXYGEN SATURATION: 97 % | HEIGHT: 72 IN | WEIGHT: 252 LBS | RESPIRATION RATE: 20 BRPM

## 2024-02-23 LAB
ALBUMIN SERPL-MCNC: 3.8 G/DL (ref 3.5–5.2)
ALP SERPL-CCNC: 73 U/L (ref 35–104)
ALT SERPL-CCNC: 17 U/L (ref 0–32)
ANION GAP SERPL CALCULATED.3IONS-SCNC: 12 MMOL/L (ref 7–16)
AST SERPL-CCNC: 17 U/L (ref 0–31)
BASOPHILS # BLD: 0.04 K/UL (ref 0–0.2)
BASOPHILS NFR BLD: 1 % (ref 0–2)
BILIRUB SERPL-MCNC: 0.4 MG/DL (ref 0–1.2)
BUN SERPL-MCNC: 11 MG/DL (ref 6–23)
CALCIUM SERPL-MCNC: 8.9 MG/DL (ref 8.6–10.2)
CHLORIDE SERPL-SCNC: 107 MMOL/L (ref 98–107)
CO2 SERPL-SCNC: 24 MMOL/L (ref 22–29)
CREAT SERPL-MCNC: 0.7 MG/DL (ref 0.5–1)
EKG ATRIAL RATE: 67 BPM
EKG P AXIS: 48 DEGREES
EKG P-R INTERVAL: 144 MS
EKG Q-T INTERVAL: 390 MS
EKG QRS DURATION: 76 MS
EKG QTC CALCULATION (BAZETT): 412 MS
EKG T AXIS: 21 DEGREES
EKG VENTRICULAR RATE: 67 BPM
EOSINOPHIL # BLD: 0.34 K/UL (ref 0.05–0.5)
EOSINOPHILS RELATIVE PERCENT: 6 % (ref 0–6)
ERYTHROCYTE [DISTWIDTH] IN BLOOD BY AUTOMATED COUNT: 13.3 % (ref 11.5–15)
GFR SERPL CREATININE-BSD FRML MDRD: >60 ML/MIN/1.73M2
GLUCOSE SERPL-MCNC: 103 MG/DL (ref 74–99)
HCT VFR BLD AUTO: 38.8 % (ref 34–48)
HGB BLD-MCNC: 12.4 G/DL (ref 11.5–15.5)
IMM GRANULOCYTES # BLD AUTO: <0.03 K/UL (ref 0–0.58)
IMM GRANULOCYTES NFR BLD: 0 % (ref 0–5)
LYMPHOCYTES NFR BLD: 1.62 K/UL (ref 1.5–4)
LYMPHOCYTES RELATIVE PERCENT: 26 % (ref 20–42)
MCH RBC QN AUTO: 27.6 PG (ref 26–35)
MCHC RBC AUTO-ENTMCNC: 32 G/DL (ref 32–34.5)
MCV RBC AUTO: 86.4 FL (ref 80–99.9)
MONOCYTES NFR BLD: 0.68 K/UL (ref 0.1–0.95)
MONOCYTES NFR BLD: 11 % (ref 2–12)
NEUTROPHILS NFR BLD: 57 % (ref 43–80)
NEUTS SEG NFR BLD: 3.5 K/UL (ref 1.8–7.3)
PLATELET # BLD AUTO: 289 K/UL (ref 130–450)
PMV BLD AUTO: 9.9 FL (ref 7–12)
POTASSIUM SERPL-SCNC: 4.4 MMOL/L (ref 3.5–5)
PROT SERPL-MCNC: 7 G/DL (ref 6.4–8.3)
RBC # BLD AUTO: 4.49 M/UL (ref 3.5–5.5)
SODIUM SERPL-SCNC: 143 MMOL/L (ref 132–146)
WBC OTHER # BLD: 6.2 K/UL (ref 4.5–11.5)

## 2024-02-23 PROCEDURE — 2580000003 HC RX 258

## 2024-02-23 PROCEDURE — 6360000002 HC RX W HCPCS

## 2024-02-23 PROCEDURE — 6370000000 HC RX 637 (ALT 250 FOR IP)

## 2024-02-23 PROCEDURE — 6370000000 HC RX 637 (ALT 250 FOR IP): Performed by: INTERNAL MEDICINE

## 2024-02-23 PROCEDURE — 85025 COMPLETE CBC W/AUTO DIFF WBC: CPT

## 2024-02-23 PROCEDURE — 80053 COMPREHEN METABOLIC PANEL: CPT

## 2024-02-23 PROCEDURE — 36415 COLL VENOUS BLD VENIPUNCTURE: CPT

## 2024-02-23 RX ORDER — LINEZOLID 600 MG/1
600 TABLET, FILM COATED ORAL 2 TIMES DAILY
Qty: 28 TABLET | Refills: 0 | Status: SHIPPED | OUTPATIENT
Start: 2024-02-23 | End: 2024-03-08

## 2024-02-23 RX ORDER — AMOXICILLIN AND CLAVULANATE POTASSIUM 875; 125 MG/1; MG/1
1 TABLET, FILM COATED ORAL 2 TIMES DAILY
Qty: 28 TABLET | Refills: 0 | Status: SHIPPED | OUTPATIENT
Start: 2024-02-23 | End: 2024-03-08

## 2024-02-23 RX ORDER — LINEZOLID 600 MG/1
600 TABLET, FILM COATED ORAL EVERY 12 HOURS SCHEDULED
Status: DISCONTINUED | OUTPATIENT
Start: 2024-02-23 | End: 2024-02-23 | Stop reason: HOSPADM

## 2024-02-23 RX ADMIN — SODIUM CHLORIDE, PRESERVATIVE FREE 10 ML: 5 INJECTION INTRAVENOUS at 09:21

## 2024-02-23 RX ADMIN — GABAPENTIN 300 MG: 300 CAPSULE ORAL at 09:21

## 2024-02-23 RX ADMIN — OXYCODONE HYDROCHLORIDE 10 MG: 10 TABLET ORAL at 17:29

## 2024-02-23 RX ADMIN — LINEZOLID 600 MG: 600 TABLET, FILM COATED ORAL at 11:40

## 2024-02-23 RX ADMIN — ACETAMINOPHEN 650 MG: 325 TABLET ORAL at 13:44

## 2024-02-23 RX ADMIN — SODIUM CHLORIDE 3000 MG: 9 INJECTION, SOLUTION INTRAVENOUS at 06:37

## 2024-02-23 RX ADMIN — ASPIRIN 325 MG: 325 TABLET ORAL at 00:34

## 2024-02-23 RX ADMIN — ACETAMINOPHEN 650 MG: 325 TABLET ORAL at 03:45

## 2024-02-23 RX ADMIN — OXYCODONE 5 MG: 5 TABLET ORAL at 09:25

## 2024-02-23 RX ADMIN — SODIUM CHLORIDE 3000 MG: 9 INJECTION, SOLUTION INTRAVENOUS at 00:38

## 2024-02-23 RX ADMIN — SODIUM CHLORIDE 3000 MG: 9 INJECTION, SOLUTION INTRAVENOUS at 11:44

## 2024-02-23 RX ADMIN — ACETAMINOPHEN 650 MG: 325 TABLET ORAL at 09:21

## 2024-02-23 RX ADMIN — GABAPENTIN 300 MG: 300 CAPSULE ORAL at 13:44

## 2024-02-23 RX ADMIN — ASPIRIN 325 MG: 325 TABLET ORAL at 09:22

## 2024-02-23 ASSESSMENT — PAIN DESCRIPTION - ORIENTATION
ORIENTATION: LEFT

## 2024-02-23 ASSESSMENT — PAIN SCALES - GENERAL
PAINLEVEL_OUTOF10: 4
PAINLEVEL_OUTOF10: 6
PAINLEVEL_OUTOF10: 2
PAINLEVEL_OUTOF10: 7
PAINLEVEL_OUTOF10: 3

## 2024-02-23 ASSESSMENT — PAIN DESCRIPTION - DESCRIPTORS
DESCRIPTORS: ACHING
DESCRIPTORS: THROBBING
DESCRIPTORS: ACHING

## 2024-02-23 ASSESSMENT — PAIN DESCRIPTION - LOCATION
LOCATION: FINGER (COMMENT WHICH ONE)
LOCATION: HAND
LOCATION: FINGER (COMMENT WHICH ONE)
LOCATION: HAND

## 2024-02-23 ASSESSMENT — PAIN - FUNCTIONAL ASSESSMENT
PAIN_FUNCTIONAL_ASSESSMENT: ACTIVITIES ARE NOT PREVENTED
PAIN_FUNCTIONAL_ASSESSMENT: PREVENTS OR INTERFERES SOME ACTIVE ACTIVITIES AND ADLS

## 2024-02-23 NOTE — PROGRESS NOTES
Discharge instrictions reviewed with patient at the bedside. All questions answered at at this time. Dressing changed and explained to patient how to change dressing and signs and symptoms of infection.

## 2024-02-23 NOTE — PLAN OF CARE
Problem: Discharge Planning  Goal: Discharge to home or other facility with appropriate resources  Outcome: Not Progressing     Problem: Discharge Planning  Goal: Discharge to home or other facility with appropriate resources  Outcome: Not Progressing     Problem: Pain  Goal: Verbalizes/displays adequate comfort level or baseline comfort level  Outcome: Not Progressing

## 2024-02-23 NOTE — PROGRESS NOTES
Occupational Therapy  Facility/Department: [address]  Occupational Therapy Screen    Name: Rasta Joseph  : 1960  MRN: [id number]  Date of Service: 2024  Room: 8211B    OT orders received & chart reviewed. Pt presents supine in bed with her  seated bedside.  Pt lives with her  in a 2-story home 2 DOUG & 15 steps 1HR to 2nd floor- walk in shower with doors. Pt is retired & they have 3 dogs & 2 cats. PLOF independent in all areas- ADL's, IADLs, transfers, mobility & driving. Pt is retired.     Pt is functionally independent with ADLs, transfers & mobility using no device. Pt has no further OT needs @ this time.    Shwetha Dubois OTR/L [id number]

## 2024-02-23 NOTE — OP NOTE
fourth finger at the level of the middle phalanx.  A 10 blade knife was then used to make incision through skin followed by spreading with tenotomy scissors.  Sterile cultures were then taken of material that was expressed from the wound which was a small amount of pus followed by mainly serosanguineous fluid.  Patient's wound was then thoroughly irrigated with approximately 100 cc of sterile saline.  Patient's finger was then packed with sterile packing and washed off with wet and dry sterile labs.  Xeroform was then applied over the packing followed by 4 x 4's Curlex and an Ace bandage.    The patient's compartments were soft and compressible at the end of this portion of the procedure.  The patient was then awakened and the patient was moved safely back to a hospital room bed, by multiple individuals. The patient was taken to the PACU in stable condition.   It should be noted that Dr. Barbosa was present for the entire procedure.    POSTOPERATIVE PLAN:   1. Weight bear, as tolerated, to the left upper extremity   2.  DVT prophylaxis- mg twice daily  3.  Starting tomorrow patient will have every shift dressing changes and daily soaks.  4. Follow up in the office in 2 weeks evaluation of wound..     Electronically signed by Jamal Ritchie DO on 2/22/2024 at 7:05 PM      I was present for the entirety of the case.  I washed out the radial sided complicated abscess on the left ring finger near the DIP joint.  There is a small amount of ezra pus.  The finger did improve after open and a longitudinal fashion.  Great care was taken to dissect around the bundle.  It was centered in that region.  It was then gino irrigated after cultures were taken and packed with iodoform gauze.  Plan will be to start hand soaks tomorrow.  IV antibiotics overnight.  Follow cultures and get infectious disease involved.  We will reintervene if clinically necessary.  The abscess did probe down to bone.  It did not appear to  involve the bone.

## 2024-02-23 NOTE — PROGRESS NOTES
4 Eyes Skin Assessment     NAME:  Rasta Joseph  YOB: 1960  MEDICAL RECORD NUMBER:  [id number]    The patient is being assessed for  Admission    I agree that at least one RN has performed a thorough Head to Toe Skin Assessment on the patient. ALL assessment sites listed below have been assessed.      Areas assessed by both nurses:    Head, Face, Ears, Shoulders, Back, Chest, Arms, Elbows, Hands, Sacrum. Buttock, Coccyx, Ischium, Legs. Feet and Heels, and Under Medical Devices         Does the Patient have a Wound? No noted wound(s)       Joaquin Prevention initiated by RN: No  Wound Care Orders initiated by RN: No    Pressure Injury (Stage 3,4, Unstageable, DTI, NWPT, and Complex wounds) if present, place Wound referral order by RN under : No    New Ostomies, if present place, Ostomy referral order under : No     Nurse 1 eSignature: Electronically signed by Guzman Talley RN on 2/23/24 at 4:21 AM EST    **SHARE this note so that the co-signing nurse can place an eSignature**    Nurse 2 eSignature: Electronically signed by Shanna Black RN on 2/23/24 at 4:24 AM EST

## 2024-02-23 NOTE — PROGRESS NOTES
CLINICAL PHARMACY NOTE: MEDS TO BEDS    Total # of Prescriptions Filled: 1   The following medications were delivered to the patient:  Percocet 5/325 mg    Additional Documentation:

## 2024-02-23 NOTE — CARE COORDINATION
2/23/24, Patient admitted for abscess of left ring finger.  SW met with patient at bedside along with patient's spouse in room. Patient lives with spouse in a 2 story home with 2 steps to enter the home from the garage.  Bed and bath are on the 2nd floor with 15 steps to get to the 2nd floor.  DME owned is none.  Patient is independent with ADL's and still drives.  Pharmacy is [facility] in [place] and PCP is Dr. Butler.  No needs identified at this time.  Spouse will be transportation for patient.  SW to follow.      KAIDEN Jones  [facility] Case Management  [phone]

## 2024-02-23 NOTE — PROGRESS NOTES
Department of Orthopedic Surgery  Resident Progress Note      SUBJECTIVE  Patient seen and examined. Pain well controlled. No new complaints. Denies acute fever, chills, nausea, vomiting , chest pain and shortness of breath.  Patient states that she is hungry and requests food otherwise no new complaints.    OBJECTIVE    Physical    VITALS:  BP (!) 168/94   Pulse 69   Temp 97.2 °F (36.2 °C) (Temporal)   Resp 19   Ht 1.88 m (6' 2\")   Wt 114.3 kg (252 lb)   SpO2 96%   BMI 32.35 kg/m²   MUSCULOSKELETAL:     left upper extremity:  Dressing C/D/I  Distal sensory intact: MRU  +AIN/PIN/M/R/U nerve function intact grossly  +2/4 Rad pulse  Compartments soft and compressible    Data    CBC:   Lab Results   Component Value Date/Time    WBC 6.2 02/23/2024 05:51 AM    RBC 4.49 02/23/2024 05:51 AM    HGB 12.4 02/23/2024 05:51 AM    HCT 38.8 02/23/2024 05:51 AM    MCV 86.4 02/23/2024 05:51 AM    MCH 27.6 02/23/2024 05:51 AM    MCHC 32.0 02/23/2024 05:51 AM    RDW 13.3 02/23/2024 05:51 AM     02/23/2024 05:51 AM    MPV 9.9 02/23/2024 05:51 AM     PT/INR:    Lab Results   Component Value Date/Time    PROTIME 11.4 10/09/2023 09:28 AM    INR 1.1 10/09/2023 09:28 AM       Labs  No results for input(s): \"BC\", \"BLOODCULT2\" in the last 72 hours.  No results for input(s): \"CXSURG\" in the last 72 hours.    Intraop Cultures: sent    ASSESSMENT  POD 1 s/p left hand ring finger irrigation debridement    PLAN    NWB left UE  Patient will require 50-50 hydrogen peroxide and sterile saline soaks.  Patient is to soak finger 3-4 times daily for approximately 2 to 5 minutes.  This has been placed as nursing communication and will need to ensure that patient is following these instructions during the course of her hospital stay.  24 hour abx coverage: Currently on Unasyn  Deep venous thrombosis prophylaxis - early mobilization, and pneumatic compression device  PT/OT  Pain Control: IV and PO  Monitor H&H  D/C Plan: Pending cultures

## 2024-02-23 NOTE — PROGRESS NOTES
Patient admitted to PACU and placed on appropriate monitors. Airway patent at this time. Report obtained from CRNA. Warm blankets applied.

## 2024-02-23 NOTE — CONSULTS
retroorbital pain  ENT: No hearing changes, no ear pain  Respiratory: No cough, no dyspnea  Cardiovascular: No chest pain, no palpitations  Gastrointestinal: No abdominal pain, no diarrhea  Genitourinary: No dysuria, no hematuria  Integumentary: No rash, no itching  Musculoskeletal: No muscle pain, no joint pain  Neurologic: No headache, no numbness in extremities    Physical Examination:  Vitals:    02/22/24 2145 02/23/24 0030 02/23/24 0745 02/23/24 0925   BP: (!) 166/92 (!) 168/94 (!) 153/83    Pulse: 77 69 74    Resp: 18 19 18 20   Temp: 98 °F (36.7 °C) 97.2 °F (36.2 °C) 97.9 °F (36.6 °C)    TempSrc: Temporal Temporal Temporal    SpO2: 94% 96% 95%    Weight:       Height:         Constitutional: Alert, not in distress  Eyes: Sclerae anicteric, no conjunctival erythema  ENT: No buccal lesion, no pharyngeal exudates  Neck: No nuchal rigidity, no cervical adenopathy  Lungs: Clear breath sounds, no crackles, no wheezes  Heart: Regular rate and rhythm, no murmurs  Abdomen: Bowel sounds present, soft, nontender  Skin: Warm and dry, no active dermatoses  Musculoskeletal:Left 4th finger incisin wound with packing in place and surrounding erythema, bruising and swelling.    Labs, imaging, and medical records/notes were personally reviewed.      Assessment:  Abscess, left finger, s/p left ring finger irrigation and debridement on 02/22. No bone involvement was noted.  Open wound of left ring finger due to cat bite    Plan:  Continue ampicillin-sulbactam 3g q6h and start oral linezolid 600 mg q12h pending tissue culture results. Patient refused to stay and wait on culture results.Patient understands the risk that current antibiotic regimen may not be appropriate for treating the wound infection without the benefit of the culture results. If she is to be discharged today, resume amoxicillin-clavulanate 875-125 mg BID for 14 days and start linezolid 600 mg q12h for 14 days.  Follow up tissue culture.  Continue local wound  care.    Thank you for involving me in the care of Rasta Joseph. ID will continue to follow. Please do not hesitate to call for any questions or concerns.      Electronically signed by Prisca Meehan MD on 2/23/2024 at 10:46 AM

## 2024-02-23 NOTE — PROGRESS NOTES
Date: 2024       Patient Name: Rasta Joseph  : 1960      MRN: [id number]    PT order received. Chart has been reviewed. PT evaluation not completed as patient noted to be Ind for mobility in the room. No equipment needs noted. Reminded patient to keep L hand elevated. .     Zac Escobedo, PT

## 2024-02-23 NOTE — PROGRESS NOTES
Floor Called, nurse to nurse given. Spoke with receiving RN . Patients test results review, VS reported to receiving nurse. Any and all important information regarding patient disclosed. Notified that patient has homegoing meds in her bag from pharmacy. Also notified that surgery cut her gold ring off in or and this nurse placed the ring that is in a specimen bag in the patient's red belongings bag.

## 2024-02-23 NOTE — ANESTHESIA POSTPROCEDURE EVALUATION
Department of Anesthesiology  Postprocedure Note    Patient: Rasta Joseph  MRN: [id number]  YOB: 1960  Date of evaluation: 2/22/2024    Procedure Summary       Date: 02/22/24 Room / Location: [address]    Anesthesia Start: 1824 Anesthesia Stop:     Procedure: LEFT HAND RING FINGER IRRIGATION AND DEBRIDEMENT OF ABSCESS WITH REMOVAL OF RING FROM LEFT FORTH FINGER (Left: Ring Finger) Diagnosis:       Abscess of left ring finger      Open wound of left ring finger due to cat bite      (Abscess of left ring finger [L02.512])      (Open wound of left ring finger due to cat bite [S61.255A, W55.01XA])    Surgeons: Jin Barbosa MD Responsible Provider: Kathy Batista MD    Anesthesia Type: MAC ASA Status: 1            Anesthesia Type: No value filed.    Rian Phase I: Rian Score: 10    Rian Phase II:      Anesthesia Post Evaluation    Patient location during evaluation: PACU  Patient participation: complete - patient participated  Level of consciousness: awake and alert  Airway patency: patent  Nausea & Vomiting: no nausea and no vomiting  Cardiovascular status: hemodynamically stable  Respiratory status: acceptable  Hydration status: euvolemic  Pain management: satisfactory to patient    No notable events documented.

## 2024-02-25 LAB
MICROORGANISM SPEC CULT: NO GROWTH
MICROORGANISM SPEC CULT: NORMAL
MICROORGANISM/AGENT SPEC: NORMAL
SPECIMEN DESCRIPTION: NORMAL
SPECIMEN DESCRIPTION: NORMAL

## 2024-02-27 NOTE — DISCHARGE SUMMARY
Physician Discharge Summary     Patient ID:  Rasta Joseph  [id number]  63 y.o.  1960    Admit date: 2/22/2024    Discharge date and time: 2/23/2024  6:18 PM     Admitting Physician: Jin Barbosa MD     Discharge Physician: Jin Barbosa MD    Admission Diagnoses: Abscess of left ring finger [L02.512]  Open wound of left ring finger due to cat bite [S61.255A, W55.01XA]    Discharge Diagnoses: s/p left hand ring finger irrigation and debridement of abscess with removal of ring from left fourth finger    Admission Condition: good    Discharged Condition: good    Hospital Course: The patient was admitted from the Holding area/ OR. After examination, review of radiologic studies, and appropriate pre-operative risk assessment, it was recommended by Jin Barbosa MD to undergo left hand ring finger irrigation and debridement of abscess with removal of ring from left fourth finger. The patient underwent an uneventful course of left hand ring finger irrigation and debridement of abscess with removal of ring from left fourth finger by Jin Barbosa MD on 2/22/2024. The patient was subsequently taken to the PACU and the floor in stable condition. The patient was continued on antibiotics for 24 hours post-operatively as they received a dose of antibiotics pre-operatively as well. The patient was started on DVT prophylaxis and physical therapy with instructions to be WBAT. Blood counts were followed daily. On POD 2, the melendez catheter was ordered to be discontinued and the dressing was changed, revealing the wound to be benign in appearance without obvious signs of infection including erythema or purulence.  was consulted for d/c planning. On POD 1, after the patient had made appropriate gains in regaining independent function with physical therapy, the patient was discharged to home in stable condition.    Treatments: s/p left hand ring finger irrigation and

## 2024-02-28 LAB
MICROORGANISM SPEC CULT: NORMAL
SPECIMEN DESCRIPTION: NORMAL

## 2024-03-01 ENCOUNTER — OFFICE VISIT (OUTPATIENT)
Dept: FAMILY MEDICINE CLINIC | Age: 64
End: 2024-03-01
Payer: COMMERCIAL

## 2024-03-01 VITALS
HEART RATE: 80 BPM | DIASTOLIC BLOOD PRESSURE: 86 MMHG | BODY MASS INDEX: 33.72 KG/M2 | HEIGHT: 72 IN | TEMPERATURE: 98.8 F | WEIGHT: 249 LBS | SYSTOLIC BLOOD PRESSURE: 136 MMHG | OXYGEN SATURATION: 97 %

## 2024-03-01 DIAGNOSIS — S61.259D CAT BITE OF FINGER, SUBSEQUENT ENCOUNTER: ICD-10-CM

## 2024-03-01 DIAGNOSIS — Z00.00 ENCOUNTER FOR WELL ADULT EXAM WITHOUT ABNORMAL FINDINGS: Primary | ICD-10-CM

## 2024-03-01 DIAGNOSIS — Z12.31 ENCOUNTER FOR MAMMOGRAM TO ESTABLISH BASELINE MAMMOGRAM: ICD-10-CM
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
## 2024-03-01 PROCEDURE — G8484 FLU IMMUNIZE NO ADMIN: HCPCS | Performed by: FAMILY MEDICINE

## 2024-03-01 PROCEDURE — 99396 PREV VISIT EST AGE 40-64: CPT | Performed by: FAMILY MEDICINE

## 2024-03-01 ASSESSMENT — PATIENT HEALTH QUESTIONNAIRE - PHQ9
SUM OF ALL RESPONSES TO PHQ9 QUESTIONS 1 & 2: 0
SUM OF ALL RESPONSES TO PHQ QUESTIONS 1-9: 0
2. FEELING DOWN, DEPRESSED OR HOPELESS: 0
SUM OF ALL RESPONSES TO PHQ QUESTIONS 1-9: 0
1. LITTLE INTEREST OR PLEASURE IN DOING THINGS: 0
SUM OF ALL RESPONSES TO PHQ QUESTIONS 1-9: 0
SUM OF ALL RESPONSES TO PHQ QUESTIONS 1-9: 0

## 2024-03-01 NOTE — PROGRESS NOTES
[facility]  Office Progress Note - Dr. Butler  3/1/24    CC:   Chief Complaint   Patient presents with    Annual Exam    Animal Bite     02/04/24 Cat bite on Left hand ring finger.        /86   Pulse 80   Temp 98.8 °F (37.1 °C) (Temporal)   Ht 1.88 m (6' 2\")   Wt 112.9 kg (249 lb)   SpO2 97%   BMI 31.97 kg/m²   Wt Readings from Last 3 Encounters:   03/01/24 112.9 kg (249 lb)   02/22/24 114.3 kg (252 lb)   02/15/24 114.3 kg (252 lb)       HPI: Patient presents for yearly physical.     Overall they are doing well.    Concerns: Recent surgery for cat bite and suspected osteomyelitis.  Had an abscess but turned out that the bone was not involved.  Wound was irrigated in the operating room.  She is completing antibiotics.  She is doing fine.  The wound looks like it is healing well.    No other specific complaints today.    Due for mammogram but declines, though contemplative for sometime this year.  Declines vaccine discussions.        Weight reviewed. Body mass index is 31.97 kg/m².  Wt Readings from Last 3 Encounters:   03/01/24 112.9 kg (249 lb)   02/22/24 114.3 kg (252 lb)   02/15/24 114.3 kg (252 lb)     Patient is  currently working on diet.  Patient is  currently working on exercise.     Vaccines reviewed.   Discussed age appropriate vaccine recommendations.     HM Reviewed.   Discussed age appropriate HM topics.   Patient was encouraged to update HM topics. Ordered where agreeable.     Reviewed age appropriate anticipatory guidance.     Recent labs reviewed include: CBC CMP from hospital, last lipids fine over time.     The 10-year ASCVD risk score (Barbara NOBLE, et al., 2019) is: 3.8%    Values used to calculate the score:      Age: 63 years      Sex: Female      Is Non- : No      Diabetic: No      Tobacco smoker: No      Systolic Blood Pressure: 136 mmHg      Is BP treated: No      HDL Cholesterol: 60 mg/dL      Total Cholesterol: 193 mg/dL    PMH, FH,

## 2024-03-08 ENCOUNTER — OFFICE VISIT (OUTPATIENT)
Dept: ORTHOPEDIC SURGERY | Age: 64
End: 2024-03-08
Payer: COMMERCIAL

## 2024-03-08 ENCOUNTER — HOSPITAL ENCOUNTER (OUTPATIENT)
Dept: GENERAL RADIOLOGY | Age: 64
Discharge: HOME | End: 2024-03-08
Payer: COMMERCIAL
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
## 2024-03-08 DIAGNOSIS — S61.255A: Primary | ICD-10-CM
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
## 2024-03-08 DIAGNOSIS — S61.255A: ICD-10-CM

## 2024-03-08 DIAGNOSIS — L02.512 ABSCESS OF LEFT RING FINGER: Primary | ICD-10-CM

## 2024-03-08 PROCEDURE — 99024 POSTOP FOLLOW-UP VISIT: CPT | Performed by: ORTHOPAEDIC SURGERY

## 2024-03-08 PROCEDURE — 99213 OFFICE O/P EST LOW 20 MIN: CPT

## 2024-03-08 PROCEDURE — 73140 X-RAY EXAM OF FINGER(S): CPT

## 2024-03-08 NOTE — PROGRESS NOTES
Chief Complaint   Patient presents with    Post-Op Check     2wk PO L ring finger I&D. Pt ambulating w/o assist. Pt states no pain at this time.        SUBJECTIVE:  Rasta Joseph is a 64 y.o. female right-hand-dominant who presents 2 weeks postop from left ring finger I&D for an abscess secondary to an infection caused by a cat bite.  Patient was prescribed Zyvox and Augmentin however the pharmacy forgot to give her the Zyvox and called her at 8 PM to pick it up.  Patient states that it was too late and she was not driving all the way back to  the Zyvox.  Patient states she has been taking her oral Augmentin as prescribed.  Patient states her fingers been feeling a lot better.  Patient denies any abnormal drainage.  Patient denies any numbness or tingling.  Denies any fever or chills.        Review of Systems -   General ROS: negative for - chills, fatigue, fever or night sweats  Respiratory ROS: no cough, shortness of breath, or wheezing  Cardiovascular ROS: no chest pain or dyspnea on exertion  Gastrointestinal ROS: no abdominal pain, change in bowel habits, or black or bloody stools  Genitourinary: no hematuria, dysuria, or incontinence   Musculoskeletal ROS:see above  Neurological ROS: no TIA or stroke symptoms       OBJECTIVE:   Alert and oriented X 3, no acute distress, respirations easy and unlabored with no audible wheezes, skin warm and dry, speech and dress appropriate for noted age, affect euthymic.    Extremity:  left upper extremity:  Well-healing incision across the ulnar aspect of her distal left ring finger.  No obvious signs of drainage or infection.  The DIP joint is erythematous however no obvious swelling or warmth appreciated.  Patient has mild tenderness to palpation about the dorsal aspect of her left ring finger DIP joint.  Denies any radial ulnar tenderness about the DIP joint.  Patient able to flex and extend at the DIP joint without any pain.  Compartments soft and

## 2024-03-23 PROBLEM — Z01.810 PRE-OPERATIVE CARDIOVASCULAR EXAMINATION: Status: RESOLVED | Noted: 2024-02-22 | Resolved: 2024-03-23

## 2024-12-23 ENCOUNTER — OFFICE VISIT (OUTPATIENT)
Dept: FAMILY MEDICINE CLINIC | Age: 64
End: 2024-12-23
Payer: COMMERCIAL

## 2024-12-23 VITALS
BODY MASS INDEX: 34.54 KG/M2 | WEIGHT: 255 LBS | DIASTOLIC BLOOD PRESSURE: 82 MMHG | TEMPERATURE: 98 F | OXYGEN SATURATION: 98 % | SYSTOLIC BLOOD PRESSURE: 128 MMHG | HEART RATE: 78 BPM | RESPIRATION RATE: 16 BRPM | HEIGHT: 72 IN

## 2024-12-23 DIAGNOSIS — J40 SINOBRONCHITIS: Primary | ICD-10-CM

## 2024-12-23 DIAGNOSIS — J32.9 SINOBRONCHITIS: Primary | ICD-10-CM

## 2024-12-23 DIAGNOSIS — H66.002 NON-RECURRENT ACUTE SUPPURATIVE OTITIS MEDIA OF LEFT EAR WITHOUT SPONTANEOUS RUPTURE OF TYMPANIC MEMBRANE: ICD-10-CM

## 2024-12-23 PROCEDURE — 99213 OFFICE O/P EST LOW 20 MIN: CPT | Performed by: PHYSICIAN ASSISTANT

## 2024-12-23 PROCEDURE — G8484 FLU IMMUNIZE NO ADMIN: HCPCS | Performed by: PHYSICIAN ASSISTANT

## 2024-12-23 PROCEDURE — 3017F COLORECTAL CA SCREEN DOC REV: CPT | Performed by: PHYSICIAN ASSISTANT

## 2024-12-23 PROCEDURE — 1036F TOBACCO NON-USER: CPT | Performed by: PHYSICIAN ASSISTANT

## 2024-12-23 PROCEDURE — G8427 DOCREV CUR MEDS BY ELIG CLIN: HCPCS | Performed by: PHYSICIAN ASSISTANT

## 2024-12-23 PROCEDURE — G8417 CALC BMI ABV UP PARAM F/U: HCPCS | Performed by: PHYSICIAN ASSISTANT

## 2024-12-23 RX ORDER — CEFDINIR 300 MG/1
300 CAPSULE ORAL 2 TIMES DAILY
Qty: 20 CAPSULE | Refills: 0 | Status: SHIPPED | OUTPATIENT
Start: 2024-12-23 | End: 2025-01-02

## 2024-12-23 RX ORDER — METHYLPREDNISOLONE 4 MG/1
TABLET ORAL
Qty: 1 KIT | Refills: 0 | Status: SHIPPED | OUTPATIENT
Start: 2024-12-23 | End: 2024-12-29

## 2024-12-23 SDOH — ECONOMIC STABILITY: INCOME INSECURITY: HOW HARD IS IT FOR YOU TO PAY FOR THE VERY BASICS LIKE FOOD, HOUSING, MEDICAL CARE, AND HEATING?: NOT HARD AT ALL

## 2024-12-23 SDOH — ECONOMIC STABILITY: FOOD INSECURITY: WITHIN THE PAST 12 MONTHS, THE FOOD YOU BOUGHT JUST DIDN'T LAST AND YOU DIDN'T HAVE MONEY TO GET MORE.: NEVER TRUE

## 2024-12-23 SDOH — ECONOMIC STABILITY: FOOD INSECURITY: WITHIN THE PAST 12 MONTHS, YOU WORRIED THAT YOUR FOOD WOULD RUN OUT BEFORE YOU GOT MONEY TO BUY MORE.: NEVER TRUE

## 2024-12-23 NOTE — PROGRESS NOTES
Chief Complaint       Cough (X2 weeks), Chest Congestion, and Sinusitis      History of Present Illness   Source of history provided by:  patient.    Rasta Joseph is a 64 y.o. old female presenting to the walk in clinic for evaluation of sinus pressure, nasal congestion, discolored nasal drainage, bilateral ear pressure (worse on the left), sore throat, productive cough, and mild chest congestion for the past 2 weeks. Denies any fever, CP, dyspnea, LE edema, abdominal pain, vomiting, rash, or lethargy. Denies any hx of asthma or COPD.       ROS    Unless otherwise stated in this report or unable to obtain because of the patient's clinical or mental status as evidenced by the medical record, this patients's positive and negative responses for Review of Systems, constitutional, psych, eyes, ENT, cardiovascular, respiratory, gastrointestinal, neurological, genitourinary, musculoskeletal, integument systems and systems related to the presenting problem are either stated in the preceding or were not pertinent or were negative for the symptoms and/or complaints related to the medical problem.    Past Medical History:  has no past medical history on file.  Past Surgical History:  has a past surgical history that includes Hysterectomy; Varicose vein surgery; Mandible fracture surgery; and Hand surgery (Left, 2/22/2024).  Social History:  reports that she has never smoked. She has never used smokeless tobacco. She reports that she does not drink alcohol and does not use drugs.  Family History: family history includes Cancer (age of onset: 70) in her father; Diabetes in her father; Heart Disease (age of onset: 62) in her mother.   Allergies: Phenazopyridine and Pyridium [phenazopyridine hcl]    Physical Exam         VS:  /82   Pulse 78   Temp 98 °F (36.7 °C) (Temporal)   Resp 16   Ht 1.88 m (6' 2.02\")   Wt 115.7 kg (255 lb)   SpO2 98%   BMI 32.73 kg/m²    Oxygen Saturation Interpretation:

## 2025-04-18 ENCOUNTER — OFFICE VISIT (OUTPATIENT)
Dept: FAMILY MEDICINE CLINIC | Age: 65
End: 2025-04-18
Payer: COMMERCIAL

## 2025-04-18 VITALS
HEIGHT: 72 IN | TEMPERATURE: 98.9 F | OXYGEN SATURATION: 96 % | HEART RATE: 77 BPM | DIASTOLIC BLOOD PRESSURE: 84 MMHG | BODY MASS INDEX: 34.54 KG/M2 | SYSTOLIC BLOOD PRESSURE: 144 MMHG | WEIGHT: 255 LBS

## 2025-04-18 DIAGNOSIS — E78.00 ELEVATED LDL CHOLESTEROL LEVEL: ICD-10-CM

## 2025-04-18 DIAGNOSIS — Z00.00 ENCOUNTER FOR WELL ADULT EXAM WITHOUT ABNORMAL FINDINGS: Primary | ICD-10-CM

## 2025-04-18 DIAGNOSIS — Z12.31 ENCOUNTER FOR MAMMOGRAM TO ESTABLISH BASELINE MAMMOGRAM: ICD-10-CM

## 2025-04-18 LAB
ALBUMIN: 4.3 G/DL (ref 3.5–5.2)
ALP BLD-CCNC: 68 U/L (ref 35–104)
ALT SERPL-CCNC: 35 U/L (ref 0–35)
ANION GAP SERPL CALCULATED.3IONS-SCNC: 9 MMOL/L (ref 7–16)
AST SERPL-CCNC: 37 U/L (ref 0–35)
BASOPHILS ABSOLUTE: 0.03 K/UL (ref 0–0.2)
BASOPHILS RELATIVE PERCENT: 1 % (ref 0–2)
BILIRUB SERPL-MCNC: 0.5 MG/DL (ref 0–1.2)
BUN BLDV-MCNC: 11 MG/DL (ref 8–23)
CALCIUM SERPL-MCNC: 9.8 MG/DL (ref 8.8–10.2)
CHLORIDE BLD-SCNC: 104 MMOL/L (ref 98–107)
CHOLESTEROL, TOTAL: 187 MG/DL
CO2: 27 MMOL/L (ref 22–29)
CREAT SERPL-MCNC: 0.8 MG/DL (ref 0.5–1)
EOSINOPHILS ABSOLUTE: 0.22 K/UL (ref 0.05–0.5)
EOSINOPHILS RELATIVE PERCENT: 4 % (ref 0–6)
GFR, ESTIMATED: 89 ML/MIN/1.73M2
GLUCOSE BLD-MCNC: 92 MG/DL (ref 74–99)
HCT VFR BLD CALC: 45.1 % (ref 34–48)
HDLC SERPL-MCNC: 60 MG/DL
HEMOGLOBIN: 14.5 G/DL (ref 11.5–15.5)
IMMATURE GRANULOCYTES %: 0 % (ref 0–5)
IMMATURE GRANULOCYTES ABSOLUTE: <0.03 K/UL (ref 0–0.58)
LDL CHOLESTEROL: 101 MG/DL
LYMPHOCYTES ABSOLUTE: 1.67 K/UL (ref 1.5–4)
LYMPHOCYTES RELATIVE PERCENT: 29 % (ref 20–42)
MCH RBC QN AUTO: 28.8 PG (ref 26–35)
MCHC RBC AUTO-ENTMCNC: 32.2 G/DL (ref 32–34.5)
MCV RBC AUTO: 89.7 FL (ref 80–99.9)
MONOCYTES ABSOLUTE: 0.64 K/UL (ref 0.1–0.95)
MONOCYTES RELATIVE PERCENT: 11 % (ref 2–12)
NEUTROPHILS ABSOLUTE: 3.23 K/UL (ref 1.8–7.3)
NEUTROPHILS RELATIVE PERCENT: 56 % (ref 43–80)
PDW BLD-RTO: 13.2 % (ref 11.5–15)
PLATELET # BLD: 259 K/UL (ref 130–450)
PMV BLD AUTO: 10.4 FL (ref 7–12)
POTASSIUM SERPL-SCNC: 4.4 MMOL/L (ref 3.5–5.1)
RBC # BLD: 5.03 M/UL (ref 3.5–5.5)
SODIUM BLD-SCNC: 140 MMOL/L (ref 136–145)
TOTAL PROTEIN: 7.6 G/DL (ref 6.4–8.3)
TRIGL SERPL-MCNC: 132 MG/DL
VLDLC SERPL CALC-MCNC: 26 MG/DL
WBC # BLD: 5.8 K/UL (ref 4.5–11.5)

## 2025-04-18 PROCEDURE — 99396 PREV VISIT EST AGE 40-64: CPT | Performed by: FAMILY MEDICINE

## 2025-04-18 ASSESSMENT — PATIENT HEALTH QUESTIONNAIRE - PHQ9
SUM OF ALL RESPONSES TO PHQ QUESTIONS 1-9: 0
2. FEELING DOWN, DEPRESSED OR HOPELESS: NOT AT ALL
1. LITTLE INTEREST OR PLEASURE IN DOING THINGS: NOT AT ALL

## 2025-04-18 NOTE — PROGRESS NOTES
[facility]  Office Progress Note - Dr. Butler  4/18/25    CC:   Chief Complaint   Patient presents with    Annual Exam        BP (!) 144/84   Pulse 77   Temp 98.9 °F (37.2 °C) (Temporal)   Ht 1.829 m (6')   Wt 115.7 kg (255 lb)   SpO2 96%   BMI 34.58 kg/m²   Wt Readings from Last 3 Encounters:   04/18/25 115.7 kg (255 lb)   12/23/24 115.7 kg (255 lb)   03/01/24 112.9 kg (249 lb)       HPI:   MARGARITO Generated Note:  History of Present Illness  The patient presents for a yearly physical exam.    She is currently not on any medication regimen. A preference to schedule her mammogram at [facility], citing Fridays as the most convenient day due to her work schedule, is expressed. The offer of both the pneumonia and shingles vaccines is declined. Physical activity is limited to household chores and yard work, with no gym attendance. No new symptoms are reported, including chest pain, shortness of breath, abdominal pain, or changes in bowel or bladder function. No alterations in vision or dental issues are reported. A long-standing nodule in the left breast is noted. She chose no further imaging at last mammo in 2020.     Antihypertensive medication has never been prescribed. A previous diagnosis of white coat syndrome is mentioned, and she monitors her blood pressure at home.    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      Patient presents for yearly physical.     Overall they are doing well.    Weight reviewed. Body mass index is 34.58 kg/m².  Wt Readings from Last 3 Encounters:   04/18/25 115.7 kg (255 lb)   12/23/24 115.7 kg (255 lb)   03/01/24 112.9 kg (249 lb)       Vaccines reviewed.   Discussed age appropriate vaccine recommendations.     HM Reviewed.   Discussed age appropriate HM topics.   Patient was encouraged to update HM topics. Ordered where agreeable.     Reviewed age appropriate anticipatory guidance.     Recent labs reviewed include:     The 10-year

## 2025-04-24 ENCOUNTER — RESULTS FOLLOW-UP (OUTPATIENT)
Dept: FAMILY MEDICINE CLINIC | Age: 65
End: 2025-04-24

## 2025-05-06 ENCOUNTER — TELEPHONE (OUTPATIENT)
Dept: FAMILY MEDICINE CLINIC | Age: 65
End: 2025-05-06

## 2025-05-06 NOTE — TELEPHONE ENCOUNTER
On call physician @ [facility] got phone call from pt regarding some orders from PCP. Attempted to call pt to let her know she'll need to reach out to Derek . Pt did not answer phone call- went straight to voicemail.

## 2025-05-13 ENCOUNTER — OFFICE VISIT (OUTPATIENT)
Dept: FAMILY MEDICINE CLINIC | Age: 65
End: 2025-05-13
Payer: COMMERCIAL

## 2025-05-13 VITALS
HEIGHT: 72 IN | DIASTOLIC BLOOD PRESSURE: 96 MMHG | BODY MASS INDEX: 34.81 KG/M2 | TEMPERATURE: 99.4 F | HEART RATE: 88 BPM | WEIGHT: 257 LBS | OXYGEN SATURATION: 98 % | SYSTOLIC BLOOD PRESSURE: 152 MMHG

## 2025-05-13 DIAGNOSIS — H66.002 NON-RECURRENT ACUTE SUPPURATIVE OTITIS MEDIA OF LEFT EAR WITHOUT SPONTANEOUS RUPTURE OF TYMPANIC MEMBRANE: ICD-10-CM

## 2025-05-13 DIAGNOSIS — J32.9 SINOBRONCHITIS: Primary | ICD-10-CM

## 2025-05-13 DIAGNOSIS — J40 SINOBRONCHITIS: Primary | ICD-10-CM

## 2025-05-13 LAB
INFLUENZA A ANTIBODY: NEGATIVE
INFLUENZA B ANTIBODY: NEGATIVE
Lab: NORMAL
PERFORMING INSTRUMENT: NORMAL
QC PASS/FAIL: NORMAL
SARS-COV-2, POC: NORMAL

## 2025-05-13 PROCEDURE — 99213 OFFICE O/P EST LOW 20 MIN: CPT | Performed by: PHYSICIAN ASSISTANT

## 2025-05-13 PROCEDURE — 1036F TOBACCO NON-USER: CPT | Performed by: PHYSICIAN ASSISTANT

## 2025-05-13 PROCEDURE — 3017F COLORECTAL CA SCREEN DOC REV: CPT | Performed by: PHYSICIAN ASSISTANT

## 2025-05-13 PROCEDURE — G8417 CALC BMI ABV UP PARAM F/U: HCPCS | Performed by: PHYSICIAN ASSISTANT

## 2025-05-13 PROCEDURE — G8427 DOCREV CUR MEDS BY ELIG CLIN: HCPCS | Performed by: PHYSICIAN ASSISTANT

## 2025-05-13 PROCEDURE — 87426 SARSCOV CORONAVIRUS AG IA: CPT | Performed by: PHYSICIAN ASSISTANT

## 2025-05-13 PROCEDURE — 87804 INFLUENZA ASSAY W/OPTIC: CPT | Performed by: PHYSICIAN ASSISTANT

## 2025-05-13 RX ORDER — METHYLPREDNISOLONE 4 MG/1
TABLET ORAL
Qty: 1 KIT | Refills: 0 | Status: SHIPPED | OUTPATIENT
Start: 2025-05-13 | End: 2025-05-19

## 2025-05-13 RX ORDER — CEFDINIR 300 MG/1
300 CAPSULE ORAL 2 TIMES DAILY
Qty: 20 CAPSULE | Refills: 0 | Status: SHIPPED | OUTPATIENT
Start: 2025-05-13 | End: 2025-05-23

## 2025-05-13 NOTE — PROGRESS NOTES
Symptoms are consistent with acute sinobronchitis and developing left otitis media.  Prescriptions written for Omnicef and a Medrol Dosepak, side effects discussed.  Increase fluids and rest. Symptomatic relief discussed including Tylenol prn pain/fever. Schedule f/u with PCP in 7-10 days if symptoms persist. ED sooner if symptoms worsen or change. ED immediately with high or refractory fever, progressive SOB, dyspnea, CP, calf pain/swelling, shaking chills, vomiting, abdominal pain, lethargy, flank pain, or decreased urinary output. Pt verbalizes understanding and is in agreement with plan of care. All questions answered.    Maren Gillis PA-C    **This report was transcribed using voice recognition software. Every effort was made to ensure accuracy; however, inadvertent computerized transcription errors may be present.

## 2025-06-19 ENCOUNTER — OFFICE VISIT (OUTPATIENT)
Dept: FAMILY MEDICINE CLINIC | Age: 65
End: 2025-06-19
Payer: COMMERCIAL

## 2025-06-19 VITALS
HEART RATE: 76 BPM | BODY MASS INDEX: 34.67 KG/M2 | TEMPERATURE: 97.5 F | HEIGHT: 72 IN | SYSTOLIC BLOOD PRESSURE: 142 MMHG | WEIGHT: 256 LBS | DIASTOLIC BLOOD PRESSURE: 90 MMHG | OXYGEN SATURATION: 98 %

## 2025-06-19 DIAGNOSIS — H69.91 DYSFUNCTION OF RIGHT EUSTACHIAN TUBE: ICD-10-CM

## 2025-06-19 DIAGNOSIS — J34.89 SINUS DRAINAGE: ICD-10-CM

## 2025-06-19 DIAGNOSIS — J01.90 ACUTE SINUSITIS, RECURRENCE NOT SPECIFIED, UNSPECIFIED LOCATION: Primary | ICD-10-CM

## 2025-06-19 DIAGNOSIS — R51.9 FACIAL PAIN: ICD-10-CM

## 2025-06-19 PROCEDURE — 3017F COLORECTAL CA SCREEN DOC REV: CPT | Performed by: INTERNAL MEDICINE

## 2025-06-19 PROCEDURE — 99213 OFFICE O/P EST LOW 20 MIN: CPT | Performed by: INTERNAL MEDICINE

## 2025-06-19 PROCEDURE — 1036F TOBACCO NON-USER: CPT | Performed by: INTERNAL MEDICINE

## 2025-06-19 PROCEDURE — G8417 CALC BMI ABV UP PARAM F/U: HCPCS | Performed by: INTERNAL MEDICINE

## 2025-06-19 PROCEDURE — G8428 CUR MEDS NOT DOCUMENT: HCPCS | Performed by: INTERNAL MEDICINE

## 2025-06-19 RX ORDER — PREDNISONE 10 MG/1
TABLET ORAL
Qty: 12 TABLET | Refills: 0 | Status: SHIPPED | OUTPATIENT
Start: 2025-06-19 | End: 2025-06-25

## 2025-06-19 ASSESSMENT — ENCOUNTER SYMPTOMS
SINUS PRESSURE: 1
SINUS PAIN: 1
SORE THROAT: 1
COUGH: 0
CHEST TIGHTNESS: 0
ABDOMINAL PAIN: 0
DIARRHEA: 0
SHORTNESS OF BREATH: 0
VOMITING: 0
WHEEZING: 0
NAUSEA: 0
EYES NEGATIVE: 1

## 2025-06-19 NOTE — PROGRESS NOTES
well-developed.   HENT:      Head: Normocephalic and atraumatic.      Right Ear: Ear canal and external ear normal.      Left Ear: Ear canal and external ear normal.      Nose: Nose normal.      Mouth/Throat:      Mouth: Mucous membranes are moist.      Pharynx: Oropharynx is clear. Posterior oropharyngeal erythema present. No oropharyngeal exudate.      Comments: Thick whitish mucus draining posterior pharynx  Eyes:      Comments: Right eye with conjunctival erythema.  No drainage noted.   Cardiovascular:      Rate and Rhythm: Normal rate and regular rhythm.      Heart sounds: Normal heart sounds. No murmur heard.  Pulmonary:      Effort: Pulmonary effort is normal. No respiratory distress.      Breath sounds: Normal breath sounds. No wheezing, rhonchi or rales.   Musculoskeletal:      Cervical back: Normal range of motion and neck supple. No tenderness.   Lymphadenopathy:      Cervical: No cervical adenopathy.   Skin:     General: Skin is warm and dry.   Neurological:      Mental Status: She is alert and oriented to person, place, and time.   Psychiatric:         Mood and Affect: Mood normal.         Behavior: Behavior normal.         Thought Content: Thought content normal.         Judgment: Judgment normal.                     Assessment and Plan:  Rasta was seen today for head congestion, drainage, eye problem, facial pain, ear fullness and pharyngitis.    Diagnoses and all orders for this visit:    Acute sinusitis, recurrence not specified, unspecified location    Facial pain    Sinus drainage    Dysfunction of right eustachian tube    Other orders  -     amoxicillin-clavulanate (AUGMENTIN) 875-125 MG per tablet; Take 1 tablet by mouth 2 times daily for 7 days  -     predniSONE (DELTASONE) 10 MG tablet; Take 3 tablets by mouth daily for 2 days, THEN 2 tablets daily for 2 days, THEN 1 tablet daily for 2 days.    Plan: Augmentin, prednisone taper dose.  Warned of potential side effects.  Probiotic.  Push

## (undated) DEVICE — BNDG,ELSTC,MATRIX,STRL,2"X5YD,LF,HOOK&LP: Brand: MEDLINE

## (undated) DEVICE — SOLUTION IRRIG 3000ML 0.9% SOD CHL USP UROMATIC PLAS CONT

## (undated) DEVICE — BANDAGE COMPR W4INXL10YD WHITE/BEIGE E MTRX HK LOOP CLSR

## (undated) DEVICE — ELECTRODE PT RET AD L9FT HI MOIST COND ADH HYDRGEL CORDED

## (undated) DEVICE — DRESSING,GAUZE,XEROFORM,CURAD,5"X9",ST: Brand: CURAD

## (undated) DEVICE — DRESSING,GAUZE,XEROFORM,CURAD,1"X8",ST: Brand: CURAD

## (undated) DEVICE — PADDING,UNDERCAST,COTTON, 4"X4YD STERILE: Brand: MEDLINE

## (undated) DEVICE — SOLUTION SCRB 4OZ 4% CHG H2O AIDED FOR PREOPERATIVE SKIN

## (undated) DEVICE — GAUZE,PACKING STRIP,IODOFORM,1/2"X5YD,ST: Brand: CURAD

## (undated) DEVICE — LOWER EXT KNEE DRAPE: Brand: MEDLINE INDUSTRIES, INC.

## (undated) DEVICE — TUBING SUCT 12FR MAL ALUM SHFT FN CAP VENT UNIV CONN W/ OBT

## (undated) DEVICE — BANDAGE,GAUZE,BULKEE II,2.25"X3YD,STRL: Brand: MEDLINE

## (undated) DEVICE — GOWN,AURORA,BRTHSLV,2XL,18/CS: Brand: MEDLINE